# Patient Record
Sex: MALE | Race: WHITE | NOT HISPANIC OR LATINO | Employment: OTHER | URBAN - METROPOLITAN AREA
[De-identification: names, ages, dates, MRNs, and addresses within clinical notes are randomized per-mention and may not be internally consistent; named-entity substitution may affect disease eponyms.]

---

## 2023-03-13 LAB
ALANINE AMINOTRANSFERASE (SGPT) (U/L) IN SER/PLAS: 26 U/L (ref 10–52)
ALBUMIN (G/DL) IN SER/PLAS: 4.8 G/DL (ref 3.4–5)
ALKALINE PHOSPHATASE (U/L) IN SER/PLAS: 75 U/L (ref 33–136)
ANION GAP IN SER/PLAS: 12 MMOL/L (ref 10–20)
ASPARTATE AMINOTRANSFERASE (SGOT) (U/L) IN SER/PLAS: 27 U/L (ref 9–39)
BILIRUBIN TOTAL (MG/DL) IN SER/PLAS: 0.7 MG/DL (ref 0–1.2)
CALCIUM (MG/DL) IN SER/PLAS: 9.9 MG/DL (ref 8.6–10.3)
CARBON DIOXIDE, TOTAL (MMOL/L) IN SER/PLAS: 31 MMOL/L (ref 21–32)
CHLORIDE (MMOL/L) IN SER/PLAS: 100 MMOL/L (ref 98–107)
CREATININE (MG/DL) IN SER/PLAS: 0.81 MG/DL (ref 0.5–1.3)
GFR MALE: >90 ML/MIN/1.73M2
GLUCOSE (MG/DL) IN SER/PLAS: 143 MG/DL (ref 74–99)
POTASSIUM (MMOL/L) IN SER/PLAS: 3.9 MMOL/L (ref 3.5–5.3)
PROTEIN TOTAL: 8 G/DL (ref 6.4–8.2)
SODIUM (MMOL/L) IN SER/PLAS: 139 MMOL/L (ref 136–145)
UREA NITROGEN (MG/DL) IN SER/PLAS: 16 MG/DL (ref 6–23)

## 2023-03-16 LAB
PROSTATE SPECIFIC AG (NG/ML) IN SER/PLAS: 0.7 NG/ML (ref 0–4)
PROSTATE SPECIFIC AG FREE (NG/ML) IN SER/PLAS: <0.1 NG/ML
PROSTATE SPECIFIC AG FREE/PROSTATE SPECIFIC AG TOTAL IN SER/PLAS: <14 %

## 2023-11-04 PROBLEM — E66.9 OBESITY: Status: ACTIVE | Noted: 2018-05-17

## 2023-11-04 PROBLEM — E78.2 MIXED HYPERLIPIDEMIA: Status: ACTIVE | Noted: 2021-07-21

## 2023-11-04 PROBLEM — I10 HYPERTENSION: Status: ACTIVE | Noted: 2023-11-04

## 2023-11-04 PROBLEM — I48.91 ATRIAL FIBRILLATION WITH RVR (MULTI): Status: ACTIVE | Noted: 2021-09-28

## 2023-11-04 PROBLEM — Z95.1 S/P CABG X 3: Status: ACTIVE | Noted: 2023-11-04

## 2023-11-04 PROBLEM — D64.9 ANEMIA: Status: ACTIVE | Noted: 2023-11-04

## 2023-11-04 PROBLEM — K21.9 GERD (GASTROESOPHAGEAL REFLUX DISEASE): Status: ACTIVE | Noted: 2023-11-04

## 2023-11-04 PROBLEM — K59.00 CONSTIPATION: Status: ACTIVE | Noted: 2023-11-04

## 2023-11-04 PROBLEM — E11.9 TYPE 2 DIABETES MELLITUS WITHOUT COMPLICATIONS (MULTI): Status: ACTIVE | Noted: 2018-05-17

## 2023-11-04 PROBLEM — E88.810 METABOLIC SYNDROME: Status: ACTIVE | Noted: 2018-05-17

## 2023-11-04 PROBLEM — M10.9 GOUT: Status: ACTIVE | Noted: 2018-05-17

## 2023-11-04 PROBLEM — E78.5 HYPERLIPIDEMIA: Status: ACTIVE | Noted: 2023-11-04

## 2023-11-04 PROBLEM — U07.1 COVID-19: Status: ACTIVE | Noted: 2022-08-17

## 2023-11-04 PROBLEM — E78.00 PURE HYPERCHOLESTEROLEMIA, UNSPECIFIED: Status: ACTIVE | Noted: 2018-05-17

## 2023-11-04 PROBLEM — G89.18 POST-OP PAIN: Status: ACTIVE | Noted: 2023-11-04

## 2023-11-04 PROBLEM — S99.921A: Status: ACTIVE | Noted: 2019-09-23

## 2023-11-04 PROBLEM — S01.01XA SCALP LACERATION: Status: ACTIVE | Noted: 2021-06-09

## 2023-11-04 PROBLEM — M79.674 PAIN IN RIGHT TOE(S): Status: ACTIVE | Noted: 2019-09-23

## 2023-11-04 PROBLEM — C44.300 MALIGNANT NEOPLASM OF SKIN OF FACE: Status: ACTIVE | Noted: 2023-11-04

## 2023-11-04 PROBLEM — R20.2 PARESTHESIAS: Status: ACTIVE | Noted: 2023-11-04

## 2023-11-04 PROBLEM — S90.129A: Status: ACTIVE | Noted: 2019-09-23

## 2023-11-04 PROBLEM — I25.10 ATHEROSCLEROTIC HEART DISEASE OF NATIVE CORONARY ARTERY WITHOUT ANGINA PECTORIS: Status: ACTIVE | Noted: 2023-11-04

## 2023-11-04 PROBLEM — L30.9 ECZEMA: Status: ACTIVE | Noted: 2019-03-22

## 2023-11-04 PROBLEM — I63.9 ISCHEMIC STROKE (MULTI): Status: ACTIVE | Noted: 2023-11-04

## 2023-11-04 PROBLEM — I10 BENIGN ESSENTIAL HTN: Status: ACTIVE | Noted: 2021-07-21

## 2023-11-04 RX ORDER — GLYBURIDE 5 MG/1
5 TABLET ORAL DAILY
COMMUNITY
End: 2023-11-07 | Stop reason: ALTCHOICE

## 2023-11-04 RX ORDER — EZETIMIBE 10 MG/1
1 TABLET ORAL DAILY
COMMUNITY
Start: 2020-08-11

## 2023-11-04 RX ORDER — DAPAGLIFLOZIN 10 MG/1
10 TABLET, FILM COATED ORAL DAILY
COMMUNITY
Start: 2018-03-05

## 2023-11-04 RX ORDER — MULTIVITAMIN
1 TABLET ORAL DAILY
COMMUNITY

## 2023-11-04 RX ORDER — OMEPRAZOLE 20 MG/1
20 CAPSULE, DELAYED RELEASE ORAL DAILY
COMMUNITY

## 2023-11-04 RX ORDER — METOPROLOL TARTRATE 50 MG/1
50 TABLET ORAL 2 TIMES DAILY
COMMUNITY
End: 2023-11-07 | Stop reason: ALTCHOICE

## 2023-11-04 RX ORDER — ASPIRIN 325 MG
50000 TABLET, DELAYED RELEASE (ENTERIC COATED) ORAL
COMMUNITY
End: 2023-11-07 | Stop reason: ALTCHOICE

## 2023-11-04 RX ORDER — BLOOD-GLUCOSE METER
EACH MISCELLANEOUS
COMMUNITY
Start: 2019-07-08

## 2023-11-04 RX ORDER — AMIODARONE HYDROCHLORIDE 400 MG/1
1 TABLET ORAL DAILY
COMMUNITY
Start: 2019-03-05 | End: 2024-01-02 | Stop reason: ALTCHOICE

## 2023-11-04 RX ORDER — INSULIN DEGLUDEC 100 U/ML
INJECTION, SOLUTION SUBCUTANEOUS
COMMUNITY
Start: 2019-05-21

## 2023-11-04 RX ORDER — FOLIC ACID 1 MG/1
1 TABLET ORAL DAILY
COMMUNITY
End: 2023-11-07 | Stop reason: ALTCHOICE

## 2023-11-04 RX ORDER — LISINOPRIL 10 MG/1
10 TABLET ORAL DAILY
COMMUNITY
Start: 2019-03-22 | End: 2023-11-07 | Stop reason: ALTCHOICE

## 2023-11-04 RX ORDER — APIXABAN 5 MG/1
1 TABLET, FILM COATED ORAL 2 TIMES DAILY
COMMUNITY
Start: 2019-03-15

## 2023-11-04 RX ORDER — TADALAFIL 20 MG/1
20 TABLET ORAL DAILY PRN
COMMUNITY
Start: 2022-01-05

## 2023-11-04 RX ORDER — METFORMIN HYDROCHLORIDE 1000 MG/1
1000 TABLET ORAL
COMMUNITY
Start: 2018-05-18 | End: 2024-01-02 | Stop reason: DRUGHIGH

## 2023-11-04 RX ORDER — DOCUSATE SODIUM 100 MG/1
100 CAPSULE, LIQUID FILLED ORAL 2 TIMES DAILY PRN
COMMUNITY
Start: 2019-03-04 | End: 2023-11-07 | Stop reason: ALTCHOICE

## 2023-11-04 RX ORDER — HYDROGEN PEROXIDE 3 %
1 SOLUTION, NON-ORAL MISCELLANEOUS DAILY
COMMUNITY
End: 2023-11-07 | Stop reason: ALTCHOICE

## 2023-11-04 RX ORDER — EXENATIDE 2 MG/.85ML
INJECTION, SUSPENSION, EXTENDED RELEASE SUBCUTANEOUS
COMMUNITY

## 2023-11-04 RX ORDER — ACETAMINOPHEN 325 MG/1
650 TABLET ORAL EVERY 6 HOURS PRN
COMMUNITY
Start: 2019-03-05 | End: 2023-11-07 | Stop reason: ALTCHOICE

## 2023-11-04 RX ORDER — ALLOPURINOL 300 MG/1
300 TABLET ORAL DAILY
COMMUNITY
Start: 2022-12-02 | End: 2024-02-17

## 2023-11-04 RX ORDER — INSULIN GLARGINE 100 [IU]/ML
INJECTION, SOLUTION SUBCUTANEOUS
COMMUNITY
Start: 2019-03-05

## 2023-11-04 RX ORDER — ERGOCALCIFEROL 1.25 MG/1
50000 CAPSULE ORAL
COMMUNITY
Start: 2019-01-29 | End: 2023-11-07 | Stop reason: ALTCHOICE

## 2023-11-04 RX ORDER — NAPROXEN SODIUM 220 MG/1
81 TABLET, FILM COATED ORAL DAILY
COMMUNITY

## 2023-11-04 RX ORDER — METOPROLOL SUCCINATE 100 MG/1
TABLET, EXTENDED RELEASE ORAL
COMMUNITY
Start: 2022-03-08

## 2023-11-04 RX ORDER — ATORVASTATIN CALCIUM 80 MG/1
1 TABLET, FILM COATED ORAL NIGHTLY
COMMUNITY

## 2023-11-04 RX ORDER — IRON POLYSACCHARIDE COMPLEX 150 MG
150 CAPSULE ORAL DAILY
COMMUNITY
Start: 2019-03-05 | End: 2023-11-07 | Stop reason: ALTCHOICE

## 2023-11-07 ENCOUNTER — OFFICE VISIT (OUTPATIENT)
Dept: PRIMARY CARE | Facility: CLINIC | Age: 68
End: 2023-11-07
Payer: MEDICARE

## 2023-11-07 VITALS
HEART RATE: 71 BPM | HEIGHT: 68 IN | SYSTOLIC BLOOD PRESSURE: 126 MMHG | OXYGEN SATURATION: 94 % | TEMPERATURE: 97 F | WEIGHT: 203 LBS | BODY MASS INDEX: 30.77 KG/M2 | DIASTOLIC BLOOD PRESSURE: 82 MMHG

## 2023-11-07 DIAGNOSIS — R53.83 FATIGUE, UNSPECIFIED TYPE: Primary | ICD-10-CM

## 2023-11-07 PROCEDURE — 1126F AMNT PAIN NOTED NONE PRSNT: CPT

## 2023-11-07 PROCEDURE — 1036F TOBACCO NON-USER: CPT

## 2023-11-07 PROCEDURE — 1159F MED LIST DOCD IN RCRD: CPT

## 2023-11-07 PROCEDURE — 3074F SYST BP LT 130 MM HG: CPT

## 2023-11-07 PROCEDURE — 3008F BODY MASS INDEX DOCD: CPT

## 2023-11-07 PROCEDURE — 3079F DIAST BP 80-89 MM HG: CPT

## 2023-11-07 PROCEDURE — 1160F RVW MEDS BY RX/DR IN RCRD: CPT

## 2023-11-07 PROCEDURE — 99213 OFFICE O/P EST LOW 20 MIN: CPT

## 2023-11-07 ASSESSMENT — ENCOUNTER SYMPTOMS
DIAPHORESIS: 0
HEADACHES: 0
CHANGE IN BOWEL HABIT: 0
WEAKNESS: 0
COUGH: 0
MYALGIAS: 0
VOMITING: 0
ANOREXIA: 0
VISUAL CHANGE: 0
NECK PAIN: 0
SORE THROAT: 0
NAUSEA: 0
SWOLLEN GLANDS: 0
VERTIGO: 0
NUMBNESS: 0
ARTHRALGIAS: 0
JOINT SWELLING: 0
CHILLS: 0
FEVER: 0
FATIGUE: 1
ABDOMINAL PAIN: 0

## 2023-11-07 ASSESSMENT — PAIN SCALES - GENERAL: PAINLEVEL: 0-NO PAIN

## 2023-11-07 ASSESSMENT — PATIENT HEALTH QUESTIONNAIRE - PHQ9
1. LITTLE INTEREST OR PLEASURE IN DOING THINGS: NOT AT ALL
SUM OF ALL RESPONSES TO PHQ9 QUESTIONS 1 AND 2: 0
2. FEELING DOWN, DEPRESSED OR HOPELESS: NOT AT ALL

## 2023-11-07 NOTE — PROGRESS NOTES
Subjective   Patient ID: Mir Chao is a 68 y.o. male who presents for Fatigue (X 2 months. Napping during day. Sleeping 10-12 hours).    Mir reports that he has always felt some fatigue; however,the last 2-3 months his fatigue has increased.  Reports he sleeps 7-8 hours of sleep per night, feels rested most mornings, but reports that some days he feels like her needs to nap while at work.  States that he is able to work and enjoys activities such as golf but feels fatigued after exertional activities.     He had a follow up appointment with his cardiologist yesterday. He mentioned his fatigue to Dr. Kim, who decreased his Metoprolol from 150 mg per day down to 100 mg per day to help with some of the fatigue side effects that can accompany beta blockers. He also ordered lab work, including CBC, CMP, TSH, Mg, and Pro-BNP, which he has not had a chance to obtain yet.    Fatigue  This is a new problem. Episode onset: 2-3 months ago. The problem occurs constantly. The problem has been unchanged. Associated symptoms include fatigue. Pertinent negatives include no abdominal pain, anorexia, arthralgias, change in bowel habit, chest pain, chills, congestion, coughing, diaphoresis, fever, headaches, joint swelling, myalgias, nausea, neck pain, numbness, rash, sore throat, swollen glands, urinary symptoms, vertigo, visual change, vomiting or weakness. The symptoms are aggravated by stress and exertion. He has tried sleep for the symptoms.        Review of Systems   Constitutional:  Positive for fatigue. Negative for chills, diaphoresis and fever.   HENT:  Negative for congestion and sore throat.    Respiratory:  Negative for cough.    Cardiovascular:  Negative for chest pain.   Gastrointestinal:  Negative for abdominal pain, anorexia, change in bowel habit, nausea and vomiting.   Musculoskeletal:  Negative for arthralgias, joint swelling, myalgias and neck pain.   Skin:  Negative for rash.   Neurological:  Negative  "for vertigo, weakness, numbness and headaches.   All other systems reviewed and are negative.      Objective   Blood Pressure 126/82 (BP Location: Left arm)   Pulse 71   Temperature 36.1 °C (97 °F) (Temporal)   Height 1.715 m (5' 7.5\")   Weight 92.1 kg (203 lb)   Oxygen Saturation 94%   Body Mass Index 31.33 kg/m²     Physical Exam  Vitals and nursing note reviewed.   Constitutional:       General: He is not in acute distress.     Appearance: Normal appearance. He is well-developed and well-groomed. He is not ill-appearing or toxic-appearing.   Eyes:      Extraocular Movements: Extraocular movements intact.      Conjunctiva/sclera: Conjunctivae normal.      Pupils: Pupils are equal, round, and reactive to light.   Neck:      Thyroid: No thyromegaly or thyroid tenderness.      Vascular: No carotid bruit or JVD.      Trachea: Trachea and phonation normal.   Cardiovascular:      Rate and Rhythm: Normal rate and regular rhythm.      Pulses: Normal pulses.      Heart sounds: Normal heart sounds, S1 normal and S2 normal. No murmur heard.  Pulmonary:      Effort: Pulmonary effort is normal. No respiratory distress.      Breath sounds: Normal breath sounds and air entry. No decreased breath sounds, wheezing, rhonchi or rales.   Musculoskeletal:         General: Normal range of motion.      Cervical back: Full passive range of motion without pain, normal range of motion and neck supple. No tenderness.      Right lower leg: No edema.      Left lower leg: No edema.   Lymphadenopathy:      Cervical: No cervical adenopathy.   Skin:     General: Skin is warm and dry.      Capillary Refill: Capillary refill takes less than 2 seconds.   Neurological:      General: No focal deficit present.      Mental Status: He is alert and oriented to person, place, and time.   Psychiatric:         Mood and Affect: Mood normal.         Behavior: Behavior normal. Behavior is cooperative.         Thought Content: Thought content normal.       "   Judgment: Judgment normal.         Assessment/Plan   Problem List Items Addressed This Visit    None  Visit Diagnoses       Diagnosis Codes    Fatigue, unspecified type    -  Primary R53.83    Relevant Orders    Vitamin D 25-Hydroxy,Total (for eval of Vitamin D levels)    Body mass index (BMI) 30.0-30.9, adult     Z68.30    Relevant Orders    Vitamin D 25-Hydroxy,Total (for eval of Vitamin D levels)          Will obtain labs to rule out anemia, electrolyte imbalance, hypothyroidism, or vitamin D deficiency.  Will follow up with results.  Fatigue will likely be greatly improved by decreased beta blocker dose.  Discussed good sleep hygiene interventions.   1. Go to bed only when sleepy.   2. The bed is for sleep and sex only. Do not eat in bed, read, use the computer, phone or watch T.V.  3. If not asleep in 15-20 minutes leave the bed and engage in a calming activity elsewhere. (Example: Read or watch a movie you have seen before in dim, dark light.) Practice relaxation exercises.  4. Return to bed when sleepy.  5. Repeat.  6. You must have a fixed wake time every day. Use an alarm.  7. No napping.  8. Keep two weeks of sleep logs while you are doing this.  9. Sleep Hygiene:   Go to bed and wake up at around same times each day, even on weekends. Avoid naps.    If you cannot fall asleep within 15 to 30 minutes after going to bed, get up and read or do some other relaxing activity until you feel tired.    Reduce stress. Do something relaxing in the evening before bedtime.    Avoid caffeine (especially after noon), alcohol, tobacco, and medicines that keep you awake.    Alcohol and recreational drugs actually make insomnia more likely.    Try drinking less water in the evening to avoid waking up to go to the bathroom during the night.    Get regular exercise for 30-60 minutes at least 3 times a week, but do it at least 4-6 hours before bedtime.    [place] the bedroom only for sleep and sexual activity, not for  watching TV or other activities    Negative envisioning. Instead of trying to go to sleep, try to stay awake!    Avoid disturbing content in books or media immediately prior to sleep. Plan 30 minutes of pleasant or relaxing activity prior to going to bed.    The computer can act as a stimulant. Discontinue working or playing on the computer one hour prior to desired sleep onset.   Sleepio or Shut-[employer] are online programs for cognitive behavioral therapy which may help.    Patient to follow up as scheduled for annual CPE next month unless symptoms worsen.

## 2023-11-13 ENCOUNTER — LAB (OUTPATIENT)
Dept: LAB | Facility: LAB | Age: 68
End: 2023-11-13
Payer: MEDICARE

## 2023-11-13 DIAGNOSIS — E78.5 HYPERLIPIDEMIA, UNSPECIFIED: Primary | ICD-10-CM

## 2023-11-13 DIAGNOSIS — R53.83 FATIGUE, UNSPECIFIED TYPE: ICD-10-CM

## 2023-11-13 DIAGNOSIS — Z13.6 ENCOUNTER FOR SCREENING FOR CARDIOVASCULAR DISORDERS: ICD-10-CM

## 2023-11-13 DIAGNOSIS — R06.09 OTHER FORMS OF DYSPNEA: ICD-10-CM

## 2023-11-13 LAB
25(OH)D3 SERPL-MCNC: 27 NG/ML (ref 30–100)
ALBUMIN SERPL BCP-MCNC: 4.4 G/DL (ref 3.4–5)
ALP SERPL-CCNC: 65 U/L (ref 33–136)
ALT SERPL W P-5'-P-CCNC: 24 U/L (ref 10–52)
ANION GAP SERPL CALC-SCNC: 14 MMOL/L (ref 10–20)
AST SERPL W P-5'-P-CCNC: 26 U/L (ref 9–39)
BILIRUB SERPL-MCNC: 0.8 MG/DL (ref 0–1.2)
BNP SERPL-MCNC: 77 PG/ML (ref 0–99)
BUN SERPL-MCNC: 15 MG/DL (ref 6–23)
CALCIUM SERPL-MCNC: 9.6 MG/DL (ref 8.6–10.3)
CHLORIDE SERPL-SCNC: 98 MMOL/L (ref 98–107)
CHOLEST SERPL-MCNC: 107 MG/DL (ref 0–199)
CHOLESTEROL/HDL RATIO: 2.6
CO2 SERPL-SCNC: 29 MMOL/L (ref 21–32)
CREAT SERPL-MCNC: 0.95 MG/DL (ref 0.5–1.3)
ERYTHROCYTE [DISTWIDTH] IN BLOOD BY AUTOMATED COUNT: 14.9 % (ref 11.5–14.5)
GFR SERPL CREATININE-BSD FRML MDRD: 86 ML/MIN/1.73M*2
GLUCOSE SERPL-MCNC: 168 MG/DL (ref 74–99)
HCT VFR BLD AUTO: 44.8 % (ref 41–52)
HDLC SERPL-MCNC: 41.9 MG/DL
HGB BLD-MCNC: 14.4 G/DL (ref 13.5–17.5)
LDLC SERPL CALC-MCNC: 35 MG/DL
MAGNESIUM SERPL-MCNC: 1.85 MG/DL (ref 1.6–2.4)
MCH RBC QN AUTO: 29.7 PG (ref 26–34)
MCHC RBC AUTO-ENTMCNC: 32.1 G/DL (ref 32–36)
MCV RBC AUTO: 92 FL (ref 80–100)
NON HDL CHOLESTEROL: 65 MG/DL (ref 0–149)
NRBC BLD-RTO: 0 /100 WBCS (ref 0–0)
PLATELET # BLD AUTO: 207 X10*3/UL (ref 150–450)
POTASSIUM SERPL-SCNC: 4.6 MMOL/L (ref 3.5–5.3)
PROT SERPL-MCNC: 7 G/DL (ref 6.4–8.2)
RBC # BLD AUTO: 4.85 X10*6/UL (ref 4.5–5.9)
SODIUM SERPL-SCNC: 136 MMOL/L (ref 136–145)
TRIGL SERPL-MCNC: 149 MG/DL (ref 0–149)
TSH SERPL-ACNC: 3.85 MIU/L (ref 0.44–3.98)
VLDL: 30 MG/DL (ref 0–40)
WBC # BLD AUTO: 8 X10*3/UL (ref 4.4–11.3)

## 2023-11-13 PROCEDURE — 85027 COMPLETE CBC AUTOMATED: CPT

## 2023-11-13 PROCEDURE — 83880 ASSAY OF NATRIURETIC PEPTIDE: CPT

## 2023-11-13 PROCEDURE — 83735 ASSAY OF MAGNESIUM: CPT

## 2023-11-13 PROCEDURE — 84443 ASSAY THYROID STIM HORMONE: CPT

## 2023-11-13 PROCEDURE — 36415 COLL VENOUS BLD VENIPUNCTURE: CPT

## 2023-11-13 PROCEDURE — 80053 COMPREHEN METABOLIC PANEL: CPT

## 2023-11-13 PROCEDURE — 80061 LIPID PANEL: CPT

## 2023-11-13 PROCEDURE — 82306 VITAMIN D 25 HYDROXY: CPT

## 2023-11-15 ENCOUNTER — TELEPHONE (OUTPATIENT)
Dept: PRIMARY CARE | Facility: CLINIC | Age: 68
End: 2023-11-15
Payer: MEDICARE

## 2024-01-02 ENCOUNTER — OFFICE VISIT (OUTPATIENT)
Dept: PRIMARY CARE | Facility: CLINIC | Age: 69
End: 2024-01-02
Payer: MEDICARE

## 2024-01-02 ENCOUNTER — TELEPHONE (OUTPATIENT)
Dept: PRIMARY CARE | Facility: CLINIC | Age: 69
End: 2024-01-02

## 2024-01-02 VITALS
SYSTOLIC BLOOD PRESSURE: 110 MMHG | BODY MASS INDEX: 30.31 KG/M2 | OXYGEN SATURATION: 94 % | DIASTOLIC BLOOD PRESSURE: 76 MMHG | HEIGHT: 68 IN | WEIGHT: 200 LBS | RESPIRATION RATE: 18 BRPM | HEART RATE: 75 BPM

## 2024-01-02 DIAGNOSIS — I10 HYPERTENSION, UNSPECIFIED TYPE: ICD-10-CM

## 2024-01-02 DIAGNOSIS — R53.82 CHRONIC FATIGUE: ICD-10-CM

## 2024-01-02 DIAGNOSIS — E78.00 PURE HYPERCHOLESTEROLEMIA, UNSPECIFIED: ICD-10-CM

## 2024-01-02 DIAGNOSIS — E11.9 TYPE 2 DIABETES MELLITUS WITHOUT COMPLICATION, WITH LONG-TERM CURRENT USE OF INSULIN (MULTI): ICD-10-CM

## 2024-01-02 DIAGNOSIS — G89.29 CHRONIC PAIN OF LEFT KNEE: Primary | ICD-10-CM

## 2024-01-02 DIAGNOSIS — I10 BENIGN ESSENTIAL HTN: ICD-10-CM

## 2024-01-02 DIAGNOSIS — Z79.899 MEDICATION MANAGEMENT: ICD-10-CM

## 2024-01-02 DIAGNOSIS — Z79.4 TYPE 2 DIABETES MELLITUS WITHOUT COMPLICATION, WITH LONG-TERM CURRENT USE OF INSULIN (MULTI): ICD-10-CM

## 2024-01-02 DIAGNOSIS — M25.562 CHRONIC PAIN OF LEFT KNEE: Primary | ICD-10-CM

## 2024-01-02 DIAGNOSIS — Z23 ENCOUNTER FOR IMMUNIZATION: ICD-10-CM

## 2024-01-02 DIAGNOSIS — Z00.00 WELL ADULT EXAM: ICD-10-CM

## 2024-01-02 DIAGNOSIS — I25.10 ATHEROSCLEROSIS OF NATIVE CORONARY ARTERY OF NATIVE HEART WITHOUT ANGINA PECTORIS: ICD-10-CM

## 2024-01-02 DIAGNOSIS — I48.91 ATRIAL FIBRILLATION WITH RVR (MULTI): ICD-10-CM

## 2024-01-02 PROCEDURE — 1159F MED LIST DOCD IN RCRD: CPT | Performed by: FAMILY MEDICINE

## 2024-01-02 PROCEDURE — 90662 IIV NO PRSV INCREASED AG IM: CPT | Performed by: FAMILY MEDICINE

## 2024-01-02 PROCEDURE — 3078F DIAST BP <80 MM HG: CPT | Performed by: FAMILY MEDICINE

## 2024-01-02 PROCEDURE — 1170F FXNL STATUS ASSESSED: CPT | Performed by: FAMILY MEDICINE

## 2024-01-02 PROCEDURE — G0439 PPPS, SUBSEQ VISIT: HCPCS | Performed by: FAMILY MEDICINE

## 2024-01-02 PROCEDURE — 99212 OFFICE O/P EST SF 10 MIN: CPT | Performed by: FAMILY MEDICINE

## 2024-01-02 PROCEDURE — G0008 ADMIN INFLUENZA VIRUS VAC: HCPCS | Performed by: FAMILY MEDICINE

## 2024-01-02 PROCEDURE — 3008F BODY MASS INDEX DOCD: CPT | Performed by: FAMILY MEDICINE

## 2024-01-02 PROCEDURE — 3074F SYST BP LT 130 MM HG: CPT | Performed by: FAMILY MEDICINE

## 2024-01-02 PROCEDURE — 1036F TOBACCO NON-USER: CPT | Performed by: FAMILY MEDICINE

## 2024-01-02 PROCEDURE — 1125F AMNT PAIN NOTED PAIN PRSNT: CPT | Performed by: FAMILY MEDICINE

## 2024-01-02 RX ORDER — CIPROFLOXACIN 500 MG/1
TABLET ORAL EVERY 12 HOURS
COMMUNITY
Start: 2020-12-08 | End: 2024-01-02 | Stop reason: ALTCHOICE

## 2024-01-02 RX ORDER — METFORMIN HYDROCHLORIDE 500 MG/1
1000 TABLET, EXTENDED RELEASE ORAL 2 TIMES DAILY
COMMUNITY
Start: 2023-05-30

## 2024-01-02 ASSESSMENT — PROMIS GLOBAL HEALTH SCALE
RATE_MENTAL_HEALTH: VERY GOOD
CARRYOUT_PHYSICAL_ACTIVITIES: COMPLETELY
RATE_GENERAL_HEALTH: GOOD
CARRYOUT_SOCIAL_ACTIVITIES: VERY GOOD
RATE_SOCIAL_SATISFACTION: VERY GOOD
RATE_QUALITY_OF_LIFE: VERY GOOD
RATE_AVERAGE_PAIN: 4
EMOTIONAL_PROBLEMS: NEVER
RATE_AVERAGE_FATIGUE: SEVERE
RATE_PHYSICAL_HEALTH: GOOD

## 2024-01-02 ASSESSMENT — PAIN SCALES - GENERAL: PAINLEVEL: 4

## 2024-01-02 ASSESSMENT — ACTIVITIES OF DAILY LIVING (ADL)
FEEDING YOURSELF: INDEPENDENT
ASSISTIVE_DEVICE: EYEGLASSES
ADEQUATE_TO_COMPLETE_ADL: YES
TOILETING: INDEPENDENT
DRESSING YOURSELF: INDEPENDENT
GROOMING: INDEPENDENT
PATIENT'S MEMORY ADEQUATE TO SAFELY COMPLETE DAILY ACTIVITIES?: YES
HEARING - LEFT EAR: FUNCTIONAL
HEARING - RIGHT EAR: FUNCTIONAL
WALKS IN HOME: INDEPENDENT
JUDGMENT_ADEQUATE_SAFELY_COMPLETE_DAILY_ACTIVITIES: YES
BATHING: INDEPENDENT

## 2024-01-02 ASSESSMENT — PATIENT HEALTH QUESTIONNAIRE - PHQ9
2. FEELING DOWN, DEPRESSED OR HOPELESS: NOT AT ALL
1. LITTLE INTEREST OR PLEASURE IN DOING THINGS: NOT AT ALL
SUM OF ALL RESPONSES TO PHQ9 QUESTIONS 1 AND 2: 0

## 2024-01-02 ASSESSMENT — ENCOUNTER SYMPTOMS
OCCASIONAL FEELINGS OF UNSTEADINESS: 0
LOSS OF SENSATION IN FEET: 0
DEPRESSION: 0

## 2024-01-02 NOTE — ASSESSMENT & PLAN NOTE
No obvious source of his fatigue.  Blood work shows no indication and exam is normal.  Perhaps he has sleep apnea.  I recommended a sleep study test.  He is to think about this and call and let me know.

## 2024-01-02 NOTE — ASSESSMENT & PLAN NOTE
Anticipate guidance given.  Will give flu shot today.  Recommended COVID-vaccine.   NP without Attending Psychiatrist

## 2024-01-02 NOTE — ASSESSMENT & PLAN NOTE
Continue current medication.  I recommend he contact his new endocrinologist to set up an appointment.

## 2024-01-02 NOTE — ASSESSMENT & PLAN NOTE
Continue current medication.  Recommend checking blood work in 6 months.  He has been doing this through his cardiologist.

## 2024-01-02 NOTE — PROGRESS NOTES
Subjective   Patient ID: Mir Chao is a 68 y.o. male who presents for Medicare Annual Wellness Visit Subsequent.    HPI   Presents for annual wellness visit.  PMH, FMH, SH and medication list reviewed and updated in chart.  PHQ-2 reviewed.  Mini cog test reviewed.  Advance care planning reviewed.        Self-assessment of health - good        Issues with ADLs - 0        Issues with IADLs - 0        Issues with home safety - 0  Last colonoscopy was in 2022.  Small polyp was seen.  It was recommended he repeat this in 5 years.  This was done in [facility].    2. He is also here for follow-up of diabetes.  He is followed by an endocrinologist.  His recent endocrinologist retired and he is been referred to a new 1.  He is currently taking metformin Farxiga and Toujeo.  Most recent A1c was 6.9.  Recent FBS is 168.    3. He is also here for follow-up of hypercholesterolemia.  He is on a atorvastatin and ezetimibe through his cardiologist.  Recent LDL is 35.    4. He is also here for follow-up of coronary artery disease.  He is status post CABG in 2018.  He is followed by Dr. Kim and is on Eliquis, lisinopril and metoprolol.    5. He is also here for follow-up of hypertension.  He is on lisinopril, metoprolol.    6. He is also here for prostate cancer.  He is followed by urologist.  Last PSA was 0.7 in 3/23.    7. He is also here for left knee pain.  He said some achiness and problems with it for the past 6 or 7 years but became worse when he twisted it in 11/23.  He has no some swelling and stiffness.Is becoming increasingly more difficult for him to exercise or walk stairs.    8. He is also here for fatigue.  Over the past 6 to 10 months he has noticed being more fatigued throughout the day.  He is finding that he is sleeping more at night but yet still somewhat tired throughout the day.  He does take naps every now and then.  He states he often wakes up not feeling refreshed.  He states he used to snore but when  "he lost weight about 5 years ago the snoring has decreased.  He was never told he had apneic events while sleeping.    Review of Systems  Constitutional symptoms: No fever, loss of appetite, headaches, fatigue.  Eyes: Negative for vision loss or blurred or double vision.  ENT: Negative for hearing loss, tinnitus, nasal congestion, rhinorrhea, nosebleeds, teeth problems, mouth sores, sore throat or dysphagia.  Cardiovascular: Negative for chest pain/pressure, palpitations, edema, claudication.  Respiratory: Negative for shortness of breath, dyspnea on exertion, pain with breathing or coughing.  Breast: Negative for tenderness, masses, gynecomastia or discharge.  Gastrointestinal: Negative for anorexia, nausea, vomiting, indigestion, pain, change in bowel habits or blood in stool.  Musculoskeletal: Negative for joint pain, joint swelling, myalgias or cramps.  Skin: Negative for rash, changing skin lesions.  Neurological: Negative for headache, numbness, tingling, weakness or tremors.  Psychiatric: Negative for depression or anxiety.  Endocrine: Negative for marked change in weight, heat or cold intolerance, polyuria, polydipsia or polyphagia.  Hematologic: Negative for bruising or abnormal bleeding.    Objective   /76   Pulse 75   Resp 18   Ht 1.727 m (5' 8\")   Wt 90.7 kg (200 lb)   SpO2 94%   BMI 30.41 kg/m²     Physical Exam  General appearance: Vital signs have been reviewed.  Comfortable.  Well-nourished and well-developed.He is alert and oriented x3 and appears his stated age.The patient is cooperative with exam.  Head: Hair pattern reveals a normal pattern for patient's age and face shows no abnormalities.  Eyes: PERRLA x2, EOMI x2, conjunctive a and sclera are clear.  Ears: External bilateral ears with normal helix, tragus and earlobe.Bilateral canals are normal.Bilateral tympanic membranes are pearly gray and landmarks are well visualized.  Nose: Nasal mucosa both nostrils reveals no polyps, " ulcerations or lesions.  Throat:Teeth are in good repair.  Posterior pharynx reveals no abnormalities.  Neck: Supple without lymphadenopathy, thyromegaly or carotid bruits.  Lungs:Clear to auscultation bilaterally with no wheezes, rales or rhonchi.  Cardiovascular: RRR without MRG.No carotid bruits.  Extremities without edema and pulses are intact.  Abdomen: Soft, NT, no masses, no hepatosplenomegaly.  Genitalia: No testicular masses.  No evidence of renal hernia.Nontender.  Rectal: Deferred due to recent colonoscopy and recent urological exam.  Musculoskeletal: 5/5 and equal strength in bilateral upper and lower extremities.  Skin: Good turgor and without rashes.  Neurological: Good overall strength and no focal deficits.  Cranial nerves II through XII are grossly intact.  Psychiatric: Patient has appropriate judgment with good insight.  Mood is appropriate.    Assessment/Plan   Problem List Items Addressed This Visit             ICD-10-CM       High    Type 2 diabetes mellitus without complications (CMS/HCC) E11.9     Continue current medication.  I recommend he contact his new endocrinologist to set up an appointment.         Pure hypercholesterolemia, unspecified E78.00     Continue current medication.  Recommend checking blood work in 6 months.  He has been doing this through his cardiologist.         Atrial fibrillation with RVR (CMS/HCC) I48.91     Continue current medication and following with his cardiologist.         Benign essential HTN I10     Continue current medication following with his cardiologist.         Atherosclerotic heart disease of native coronary artery without angina pectoris I25.10     Continue current medication following with his cardiologist Dr. Kim.         Well adult exam Z00.00     Anticipate guidance given.  Will give flu shot today.  Recommended COVID-vaccine.            Medium    Chronic pain of left knee - Primary M25.562, G89.29     Will refer to Dr. Rosas for further  evaluation.  I suspect osteoarthritis.         Relevant Orders    Referral to Orthopaedic Surgery    Chronic fatigue R53.82     No obvious source of his fatigue.  Blood work shows no indication and exam is normal.  Perhaps he has sleep apnea.  I recommended a sleep study test.  He is to think about this and call and let me know.          Other Visit Diagnoses         Codes    Encounter for immunization     Z23    Relevant Orders    Flu vaccine, quadrivalent, high-dose, preservative free, age 65y+ (FLUZONE) (Completed)

## 2024-01-05 ENCOUNTER — OFFICE VISIT (OUTPATIENT)
Dept: ORTHOPEDIC SURGERY | Facility: CLINIC | Age: 69
End: 2024-01-05
Payer: MEDICARE

## 2024-01-05 ENCOUNTER — ANCILLARY PROCEDURE (OUTPATIENT)
Dept: RADIOLOGY | Facility: CLINIC | Age: 69
End: 2024-01-05
Payer: MEDICARE

## 2024-01-05 VITALS — BODY MASS INDEX: 26.52 KG/M2 | WEIGHT: 175 LBS | HEIGHT: 68 IN

## 2024-01-05 DIAGNOSIS — M94.262 CHONDROMALACIA, KNEE, LEFT: ICD-10-CM

## 2024-01-05 DIAGNOSIS — M22.2X2 PATELLOFEMORAL PAIN SYNDROME OF LEFT KNEE: ICD-10-CM

## 2024-01-05 DIAGNOSIS — M25.562 PAIN OF LEFT PATELLOFEMORAL JOINT: ICD-10-CM

## 2024-01-05 DIAGNOSIS — S83.242A ACUTE MEDIAL MENISCUS TEAR OF LEFT KNEE, INITIAL ENCOUNTER: ICD-10-CM

## 2024-01-05 DIAGNOSIS — G89.29 CHRONIC PAIN OF LEFT KNEE: ICD-10-CM

## 2024-01-05 DIAGNOSIS — M25.562 LEFT KNEE PAIN, UNSPECIFIED CHRONICITY: ICD-10-CM

## 2024-01-05 DIAGNOSIS — M25.562 CHRONIC PAIN OF LEFT KNEE: ICD-10-CM

## 2024-01-05 DIAGNOSIS — M17.12 ARTHRITIS OF LEFT KNEE: Primary | ICD-10-CM

## 2024-01-05 PROCEDURE — 1159F MED LIST DOCD IN RCRD: CPT | Performed by: ORTHOPAEDIC SURGERY

## 2024-01-05 PROCEDURE — 20610 DRAIN/INJ JOINT/BURSA W/O US: CPT | Performed by: ORTHOPAEDIC SURGERY

## 2024-01-05 PROCEDURE — 73564 X-RAY EXAM KNEE 4 OR MORE: CPT | Mod: LEFT SIDE | Performed by: RADIOLOGY

## 2024-01-05 PROCEDURE — 3008F BODY MASS INDEX DOCD: CPT | Performed by: ORTHOPAEDIC SURGERY

## 2024-01-05 PROCEDURE — 1036F TOBACCO NON-USER: CPT | Performed by: ORTHOPAEDIC SURGERY

## 2024-01-05 PROCEDURE — 73564 X-RAY EXAM KNEE 4 OR MORE: CPT | Mod: LT

## 2024-01-05 PROCEDURE — 99204 OFFICE O/P NEW MOD 45 MIN: CPT | Performed by: ORTHOPAEDIC SURGERY

## 2024-01-05 PROCEDURE — 1125F AMNT PAIN NOTED PAIN PRSNT: CPT | Performed by: ORTHOPAEDIC SURGERY

## 2024-01-05 RX ORDER — DICLOFENAC SODIUM 10 MG/G
4 GEL TOPICAL 4 TIMES DAILY PRN
Qty: 100 G | Refills: 0 | Status: SHIPPED | OUTPATIENT
Start: 2024-01-05

## 2024-01-05 RX ORDER — LIDOCAINE HYDROCHLORIDE 5 MG/ML
4 INJECTION, SOLUTION INFILTRATION; PERINEURAL
Status: COMPLETED | OUTPATIENT
Start: 2024-01-05 | End: 2024-01-05

## 2024-01-05 RX ORDER — TRIAMCINOLONE ACETONIDE 40 MG/ML
40 INJECTION, SUSPENSION INTRA-ARTICULAR; INTRAMUSCULAR
Status: COMPLETED | OUTPATIENT
Start: 2024-01-05 | End: 2024-01-05

## 2024-01-05 RX ADMIN — TRIAMCINOLONE ACETONIDE 40 MG: 40 INJECTION, SUSPENSION INTRA-ARTICULAR; INTRAMUSCULAR at 14:46

## 2024-01-05 RX ADMIN — LIDOCAINE HYDROCHLORIDE 4 ML: 5 INJECTION, SOLUTION INFILTRATION; PERINEURAL at 14:46

## 2024-01-05 ASSESSMENT — PAIN - FUNCTIONAL ASSESSMENT: PAIN_FUNCTIONAL_ASSESSMENT: 0-10

## 2024-01-05 ASSESSMENT — PAIN SCALES - GENERAL: PAINLEVEL_OUTOF10: 2

## 2024-01-05 NOTE — PROGRESS NOTES
68-year-old male with over 1 month of left knee pain when he was carrying a [place] tree and twisted his left knee.  He had pain primarily of the anterior and posterior aspect of the left knee worse activity better with rest.  Patient takes Eliquis    Patients' self reported past medical history, medications, allergies, surgical history, family and social history as well as a 10 point review of systems has been documented in the new patient intake form and scanned into the patient's electronic medical record.  The intake form was reviewed by Dr Murphy during the office visit and signed by Dr. Murphy and the patient.  Pertinent findings are documented in the HPI.    General Multi-System Physical Exam:  Constitutional  General appearance:  Alert, oriented, and in no acute distress.  Well developed, well nourished.  Head and Face  Head and face:  Normocephalic and atraumatic.  Ears, Nose, Mouth, and Throat  External inspection of ears and nose: Normal.  Eyes:  Pupils are equal and round.  Neck  Neck:  no neck mass was observed.  Pulmonary  Respiratory effort:  no respiratory distress.  Cardiovascular  Intact distal pulses.  Lymphatic  Palpation of lymph nodes in the affected extremity:  Normal.  Skin  Skin and subcutaneous tissue:  Normal skin color and pigmentation.  Normal skin turgor.  No rashes.  Neurologic  Sensation:  normal to light touch.  Psychiatric  Judgement and insight:  Intact.  Mood and affect:  Normal.  Musculoskeletal  Positive left knee patellar grind with mild medial and lateral joint line tenderness full range of motion the knee.  Patient has a negative Lockman exam, negative anterior and negative posterior drawer. The knee is stable to varus and valgus stress without pain. Patient is neurovascularly intact in the bilateral lower extremities.      X-rays of the patient were ordered by Dr Murphy and obtained today.  Dr Murphy personally reviewed the results of the x-rays.    In addition, Dr Murphy  independently interpreted the patient's x-rays (performed by the Radiology department) by viewing the x-ray images and this is Dr. Murphy's personal interpretation:     Mild arthritis left knee    We had a long discussion in regards to the patient's knee pain.  We discussed knee arthritis as well as meniscus tears today.      Nonoperative and operative therapies for knee arthritis include weight loss, physical therapy, anti-inflammatory medications, steroid injections, viscosupplementation injections, bracing, walking aids such as a cane, knee arthroscopy and total knee replacement.  Knee arthritis is a progressive disease and while we cannot reverse the stages of arthritis, we hope to make the patient more comfortable.     We talked about weight loss.  When the patient walks, especially going up and down stairs, their weight is magnified on their knee up to 5 times.  For that reason, 20 pounds of extra weight feels like 100 pounds of extra weight on the knees.  Excess weight is a well-known source of knee pain and losing weight can specifically help reduce the knee pain.  [facility] offers multiple ways to help the patient with weight loss including nutritionists and a bariatric department that would be more than happy to see and evaluate the patient if they had trouble with their weight and would like to lose weight.    If a meniscal tear is going to heal, it usually does so within the first 4 weeks.  The patient is usually aware that the meniscus has healed due to the fact that the pain resolves.  Unfortunately, due to the poor vascular supply of the meniscus, there is only about a 20% chance that a meniscal tear will heal on its own.  Possible treatment options for meniscus tears include nonoperative conservative therapy with anti-inflammatory medications, physical therapy, and sometimes steroid injections into the knee versus knee arthroscopy and meniscus debridement versus meniscus repair.      We  "will start off by treating the patient's knee conservatively (AKA non-operatively).  We gave the patient a prescription for physical therapy to work on increasing the range of motion and strength in the knee.  We also gave the patient a \"home exercise protocol\" list of exercises that they could work on to strengthen their knee at home.  We also called in a prescription for anti-inflammatories for the patient.  The patient was informed that there are rare risks of using nonsteroidal antiinflammatory (NSAID) medications.  Risks of NSAIDS include, but are not limited to, upset stomach, ulcers in the stomach and other places in the gastrointestinal tract, and a mild increase in cardiovascular risk as a result of the antiinflammatory medications.  In addition, there is an increased risk in bleeding as a result of the medications.  The patient was advised to stop taking the NSAIDs if they cause them to have an upset stomach.  The patient was instructed to take the medication on a p.r.n. basis as needed only.  NSAIDs are not supposed to be taken every day for more than a few weeks.  If they have any questions or problems with the antiinflammatory medications, they should stop taking the medication immediately and call the office.    We offered the patient an injection of steroids into their knee.  I explained to the patient that there are some rare risks of steroid injections.      Rare risks of steroid injections into the knee include pain at the site of the injection, local swelling, irritation from the injection or the skin spray, local discoloration of the skin, risk of bleeding, and a risk of knee infection.  If the patient does have a flareup of pain in the evening following the injection, they should ice the knee 15 minutes at a time 3 times a day for up to 3 days.  If the pain gets too severe, they should go to a local Emergency Room right away.      After understanding that there are risks and benefits of steroid " injections, the patient decided to proceed with injection.  The knee was prepped sterilely with betadyne and 5 mL of 0.25% Marcaine and 1 mL of Kenalog 40 mg was injected into the knee without complications.  A bandage was applied at the site of the injection.  The patient tolerated the procedure well.    We will see the patient back in 6-8 weeks to reevaluate their knee pain. If they are still having pain at that time, we would then need to order an MRI to look for possible meniscus tears and assess for cartilage damage in the knee.  The patient should call the office during business hours (9am-3pm; Monday - Friday)  with any questions or problems.  If the patient has any urgent issues outside of business hours, they should go to a local Emergency Room.          This patient has a new, acute, previously-undiagnosed problem of their affected extremity.  We will begin treatment as listed here and monitor treatment based upon their progression and response to treatment.  Due to the fact that we are just beginning treatment on this issue, this is currently considered an undiagnosed new problem with uncertain prognosis.  The exact diagnosis and their prognosis will depend upon their response to treatment and progression of their condition as time progresses.    Due to this patient's condition, they are at a moderate risk of morbidity from additional diagnostic testing / treatment.      To help them with their pain, I wrote them a prescription for prescription strength anti-inflammatories.  The patient was informed that there are risks of using nonsteroidal antiinflammatory (NSAID) medications.    Risks of NSAIDS include, but are not limited to, upset stomach, ulcers in the stomach and other places in the gastrointestinal tract, and a mild increase in cardiovascular risk as a result of the antiinflammatory medications.  In addition, there is an increased risk in bleeding as a result of the medications.    The patient was  advised to stop taking the NSAIDs if they cause them to have an upset stomach.  NSAIDs are not supposed to be taken every day for more than a few weeks.  If they have any questions or problems with the antiinflammatory medications, they should stop taking the medication immediately and call the office.      Patient ID: Mir Chao is a 68 y.o. male.    L Inj/Asp: L knee on 1/5/2024 2:46 PM  Indications: pain  Details: 22 G needle, anterolateral approach  Medications: 40 mg triamcinolone acetonide 40 mg/mL; 4 mL lidocaine 5 mg/mL (0.5 %)  Outcome: tolerated well, no immediate complications  Procedure, treatment alternatives, risks and benefits explained, specific risks discussed. Consent was given by the patient. Immediately prior to procedure a time out was called to verify the correct patient, procedure, equipment, support staff and site/side marked as required. Patient was prepped and draped in the usual sterile fashion.

## 2024-02-08 ENCOUNTER — EVALUATION (OUTPATIENT)
Dept: PHYSICAL THERAPY | Facility: CLINIC | Age: 69
End: 2024-02-08
Payer: MEDICARE

## 2024-02-08 DIAGNOSIS — M22.2X2 PATELLOFEMORAL PAIN SYNDROME OF LEFT KNEE: ICD-10-CM

## 2024-02-08 PROCEDURE — 97110 THERAPEUTIC EXERCISES: CPT | Mod: GP

## 2024-02-08 PROCEDURE — 97162 PT EVAL MOD COMPLEX 30 MIN: CPT | Mod: GP

## 2024-02-08 ASSESSMENT — ENCOUNTER SYMPTOMS
DEPRESSION: 0
LOSS OF SENSATION IN FEET: 0
OCCASIONAL FEELINGS OF UNSTEADINESS: 0

## 2024-02-08 ASSESSMENT — PAIN - FUNCTIONAL ASSESSMENT: PAIN_FUNCTIONAL_ASSESSMENT: 0-10

## 2024-02-08 ASSESSMENT — PAIN SCALES - GENERAL: PAINLEVEL_OUTOF10: 5 - MODERATE PAIN

## 2024-02-08 NOTE — Clinical Note
February 8, 2024    Miller Dawson, PT  [address] [facility]  Rehab Services, [facility] [address]  [facility] [address]    Patient: Mir Chao   YOB: 1955   Date of Visit: 2/8/2024       Dear KACI Murphy MD  [address] [facility] [place] [address]    The attached plan of care is being sent to you because your patient’s medical reimbursement requires that you certify the plan of care. Your signature is required to allow uninterrupted insurance coverage.      You may indicate your approval by signing below and faxing this form back to us at Dept Fax: [phone].    Please call Dept: [phone] with any questions or concerns.    Thank you for this referral,        Miller Dawson, PT  [facility] [address] [facility]  [address] [facility] [address]    Payer: Payor: MEDICARE / Plan: MEDICARE PART A AND B / Product Type: *No Product type* /                                                                         Date:     Dear Miller Dawson, PT,     Re: Mr. Mir Chao, MRN:[id number]    I certify that I have reviewed the attached plan of care and it is medically necessary for Mr. Mir Chao (1955) who is under my care.          ______________________________________                    _________________  Provider name and credentials                                           Date and time                                                                                           Plan of Care 2/8/24   Effective from: 2/8/2024  Effective to: 5/2/2024    Plan ID: [id number]            Participants as of Finalize on 2/8/2024    Name Type Comments Contact Info    KACI Murphy MD Referring Provider  [phone]    Miller Dawson PT Physical Therapist  [phone]       Last Plan Note     Author: Miller Dawson PT Status: Incomplete Last edited: 2/8/2024  4:00 PM           Physical Therapy  Physical Therapy Evaluation    Patient Name: Mir Chao  MRN:  [id number]  Today's Date: 2/8/2024  Time Calculation  Start Time: 1601  Stop Time: 1647  Time Calculation (min): 46 min    Insurance:  Visit number: 1 of 17  Insurance Type: no AUTH    General  Reason for visit: General  Reason for Referral: L knee pain  Past Medical History Relevant to Rehab: CVA, CABG, DM  General Comment: Pt felt a big twinge in his L knee around [place] time, hurts on descent of stairs and after ~12 minutes of treadmill work, states he had a stroke in 2016 effecting the L side    Current Problem  1. Patellofemoral pain syndrome of left knee  Referral to Physical Therapy    Follow Up In Physical Therapy          Precautions: none  Precautions  STEADI Fall Risk Score (The score of 4 or more indicates an increased risk of falling): 1    Medical History Form: Reviewed (scanned into chart)    Subjective:   Onset: 12/24/2023  CLARITA: Insidious     Current Condition since injury:   same     PAIN  Pain Assessment: 0-10  Pain Score: 5 - Moderate pain  Pain Type: Acute pain  Pain Location: Knee  Pain Orientation: Left    Aggravating Factors: Other stairs, exercises  Relieving Factors: Rest     Relevant Information (PMH & Previous Tests/Imaging): CVA, CABG, DM   Previous Interventions/Treatments: None     Prior Level of Function (PLOF)  Exercise/Physical Activity: limited by pain  Work/School: works as   Current ADL/IADL Status: no issues     Patients Living Environment: Reviewed and no concern    Primary Language: English    Pt goals for therapy: walk without pain and exercise more    Red Flags:   REVIEW OF SYSTEMS/ RED FLAGS  (-) osteoporosis  (-) balance difficulties/FALLS   (-) numbness/tingle  (-) weakness  (-) unremitting night pain   (-) AM Stiffness:            (-) Unexplained Wt. Loss  (+) h/o CA   (-) Pacemaker  (-) Bowel/Bladder            (-) saddle paresthesia    Objective:    LE FLOW SHEET    Gait:  decreased trunk swing, pelvic drop during RLE IC, decreased TKE LLE at IC     Lumbar  AROM  Lumbar AROM WFL: yes    Functional Rating Scale  Other: WOMAC 22% self reported disability    Lumbar AROM  Lumbar AROM WFL: yes    Palpation/Joint Mobility Assessment  TTP GT, glute insertion to IT band, pes anserine    Hip AROM  R hip flexion: (125°): 110  L hip flexion: (125°): 120  R hip abduction: (45°): 45  L hip abduction: (45°): 45  R hip extension: (10°): -10  L hip extension: (10°): 0  R hip ER: (45°): 30  L hip ER: (45°): 45  R hip IR: (45°): 20  L hip IR: (45°): 20    Flexibility  R hamstrings: -45  R hamstrings: -50    Specific Lower Extremity MMT  R Iliopsoas: (5/5): 5/5  L Iliopsoas: (5/5): 4+/5  R Gluteals (prone): (5/5): 3-/5  L Gluteals (prone): (5/5): 3-/5  R Gluteals (sidelying): (5/5): 3+/5  L Gluteals (sidelying): (5/5): 3+/5  R knee flexion: (5/5): 5/5  L knee flexion: (5/5): 4+/5  R knee extension: (5/5): 5/5  L knee extension: (5/5): 4+/5    Flexibility  R hamstrings: -45  L hamstrings: -45    Knee AROM  R knee flexion: (140°): 130  L knee flexion: (140°): 125  R knee extension: (0°): 0  L knee extension: (0°): -5    Knee special tests: all negative  With exception, LLE appears longer than RLE      EDUCATION: home exercise program, plan of care, activity modifications, pain management, and injury pathology       Goals:  Active       PT Problem        demo HEP w/ at least 75% accuracy in order to increase strength and flexibility        Start:  02/08/24    Expected End:  05/02/24            increased ROM of L knee 5 degrees for extension in order to improve gait mechanics        Start:  02/08/24    Expected End:  05/02/24            Pt will improve WOMAC score by 10% to demonstrate in order to show increased functional mobility        Start:  02/08/24    Expected End:  05/02/24            demonstrate 2/3 a muscle grade strength increase in b/l hips and L knee in order to complete stairs easier         Start:  02/08/24    Expected End:  05/02/24            report 25% decrease in pain  intensity in order to complete work tasks with greater ease        Start:  02/08/24    Expected End:  05/02/24              Treatments:   This therapist instructed and demonstrated interventions to patient, patient completed the following under direct supervision of this therapist:  Therapeutic Exercise:   min  16 MINUTES  Therapeutic Exercise  Therapeutic Exercise Activity 1: qaud set LLE towel propped under ankle x20  Therapeutic Exercise Activity 2: glute bridges 2x20  Therapeutic Exercise Activity 3: sidelying clamshells x20         Assessment:   Assessment Comment: Pt presents with L knee pain effecting the patellar tendon most that impairs his ability to negotiate stairs, exercise at his desired level, and walking.  He presents with decreased strength in the hips, and L knee, increased spasticity and clonus in the LLE residual from his stroke, TTP at the GT bursa and glute insertion at the IT band as well as pain to touch at the distal end of the IT band.  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  He would benefit from skilled therapy to improve mobility, gait, and strength to tallow for improve functional movement and QoL.    Plan:   Treatment/Interventions: Cryotherapy, Blood flow restriction therapy, Dry needling, Education/ Instruction, Electrical stimulation, Gait training, Hot pack, Manual therapy, Neuromuscular re-education, Self care/ home management, Taping techniques, Therapeutic activities, Therapeutic exercises  PT Plan: Skilled PT  PT Frequency: 1 time per week  Duration: 12 weeks  Rehab Potential: Good  Plan of Care Agreement: Patient    Access Code: [email or web address]  URL: [email or web address]  Date: 02/08/2024  Prepared by: Miller Dawson    Exercises  - Supine Bridge  - 2 x daily - 7 x weekly - 3 sets - 20-30 reps  - Clamshell  - 2 x daily - 7 x weekly - 3 sets - 20-30 reps  -  Long Sitting Quad Set with Towel Roll Under Heel  - 2 x daily - 7 x weekly - 3 sets - 20-30 reps      Miller Dawson PT               Current Participants as of 2/8/2024    Name Type Comments Contact Info    KACI Murphy MD Referring Provider  [phone]    Signature pending    Miller Dawson, FAVIOLA Physical Therapist  [phone]    Signature pending

## 2024-02-08 NOTE — PROGRESS NOTES
Physical Therapy  Physical Therapy Evaluation    Patient Name: Mir Chao  MRN: [id number]  Today's Date: 2/8/2024  Time Calculation  Start Time: 1601  Stop Time: 1647  Time Calculation (min): 46 min    Insurance:  Visit number: 1 of 17  Insurance Type: no AUTH    General  Reason for visit: General  Reason for Referral: L knee pain  Past Medical History Relevant to Rehab: CVA, CABG, DM  General Comment: Pt felt a big twinge in his L knee around [place] time, hurts on descent of stairs and after ~12 minutes of treadmill work, states he had a stroke in 2016 effecting the L side    Current Problem  1. Patellofemoral pain syndrome of left knee  Referral to Physical Therapy    Follow Up In Physical Therapy          Precautions: none  Precautions  STEADI Fall Risk Score (The score of 4 or more indicates an increased risk of falling): 1    Medical History Form: Reviewed (scanned into chart)    Subjective:   Onset: 12/24/2023  CLARITA: Insidious     Current Condition since injury:   same     PAIN  Pain Assessment: 0-10  Pain Score: 5 - Moderate pain  Pain Type: Acute pain  Pain Location: Knee  Pain Orientation: Left    Aggravating Factors: Other stairs, exercises  Relieving Factors: Rest     Relevant Information (PMH & Previous Tests/Imaging): CVA, CABG, DM   Previous Interventions/Treatments: None     Prior Level of Function (PLOF)  Exercise/Physical Activity: limited by pain  Work/School: works as   Current ADL/IADL Status: no issues     Patients Living Environment: Reviewed and no concern    Primary Language: English    Pt goals for therapy: walk without pain and exercise more    Red Flags:   REVIEW OF SYSTEMS/ RED FLAGS  (-) osteoporosis  (-) balance difficulties/FALLS   (-) numbness/tingle  (-) weakness  (-) unremitting night pain   (-) AM Stiffness:            (-) Unexplained Wt. Loss  (+) h/o CA   (-) Pacemaker  (-) Bowel/Bladder            (-) saddle paresthesia    Objective:    LE FLOW SHEET    Gait:   decreased trunk swing, pelvic drop during RLE IC, decreased TKE LLE at IC     Lumbar AROM  Lumbar AROM WFL: yes    Functional Rating Scale  Other: WOMAC 22% self reported disability    Lumbar AROM  Lumbar AROM WFL: yes    Palpation/Joint Mobility Assessment  TTP GT, glute insertion to IT band, pes anserine    Hip AROM  R hip flexion: (125°): 110  L hip flexion: (125°): 120  R hip abduction: (45°): 45  L hip abduction: (45°): 45  R hip extension: (10°): -10  L hip extension: (10°): 0  R hip ER: (45°): 30  L hip ER: (45°): 45  R hip IR: (45°): 20  L hip IR: (45°): 20    Flexibility  R hamstrings: -45  R hamstrings: -50    Specific Lower Extremity MMT  R Iliopsoas: (5/5): 5/5  L Iliopsoas: (5/5): 4+/5  R Gluteals (prone): (5/5): 3-/5  L Gluteals (prone): (5/5): 3-/5  R Gluteals (sidelying): (5/5): 3+/5  L Gluteals (sidelying): (5/5): 3+/5  R knee flexion: (5/5): 5/5  L knee flexion: (5/5): 4+/5  R knee extension: (5/5): 5/5  L knee extension: (5/5): 4+/5    Flexibility  R hamstrings: -45  L hamstrings: -45    Knee AROM  R knee flexion: (140°): 130  L knee flexion: (140°): 125  R knee extension: (0°): 0  L knee extension: (0°): -5    Knee special tests: all negative  With exception, LLE appears longer than RLE      EDUCATION: home exercise program, plan of care, activity modifications, pain management, and injury pathology       Goals:  Active       PT Problem        demo HEP w/ at least 75% accuracy in order to increase strength and flexibility        Start:  02/08/24    Expected End:  05/02/24            increased ROM of L knee 5 degrees for extension in order to improve gait mechanics        Start:  02/08/24    Expected End:  05/02/24            Pt will improve WOMAC score by 10% to demonstrate in order to show increased functional mobility        Start:  02/08/24    Expected End:  05/02/24            demonstrate 2/3 a muscle grade strength increase in b/l hips and L knee in order to complete stairs easier          Start:  02/08/24    Expected End:  05/02/24            report 25% decrease in pain intensity in order to complete work tasks with greater ease        Start:  02/08/24    Expected End:  05/02/24              Treatments:   This therapist instructed and demonstrated interventions to patient, patient completed the following under direct supervision of this therapist:  Therapeutic Exercise:   min  16 MINUTES  Therapeutic Exercise  Therapeutic Exercise Activity 1: qaud set LLE towel propped under ankle x20  Therapeutic Exercise Activity 2: glute bridges 2x20  Therapeutic Exercise Activity 3: sidelying clamshells x20         Assessment:   Assessment Comment: Pt presents with L knee pain effecting the patellar tendon most that impairs his ability to negotiate stairs, exercise at his desired level, and walking.  He presents with decreased strength in the hips, and L knee, increased spasticity and clonus in the LLE residual from his stroke, TTP at the GT bursa and glute insertion at the IT band as well as pain to touch at the distal end of the IT band.  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  He would benefit from skilled therapy to improve mobility, gait, and strength to tallow for improve functional movement and QoL.    Plan:   Treatment/Interventions: Cryotherapy, Blood flow restriction therapy, Dry needling, Education/ Instruction, Electrical stimulation, Gait training, Hot pack, Manual therapy, Neuromuscular re-education, Self care/ home management, Taping techniques, Therapeutic activities, Therapeutic exercises  PT Plan: Skilled PT  PT Frequency: 1 time per week  Duration: 12 weeks  Rehab Potential: Good  Plan of Care Agreement: Patient    Access Code: [email or web address]  URL: [email or web address]  Date: 02/08/2024  Prepared by: Miller Dawson    Exercises  - Supine Bridge  - 2 x daily - 7 x weekly - 3  sets - 20-30 reps  - Clamshell  - 2 x daily - 7 x weekly - 3 sets - 20-30 reps  - Long Sitting Quad Set with Towel Roll Under Heel  - 2 x daily - 7 x weekly - 3 sets - 20-30 reps      Miller Dawson, PT

## 2024-02-15 DIAGNOSIS — M10.9 GOUT, UNSPECIFIED CAUSE, UNSPECIFIED CHRONICITY, UNSPECIFIED SITE: ICD-10-CM

## 2024-02-17 RX ORDER — ALLOPURINOL 300 MG/1
300 TABLET ORAL DAILY
Qty: 90 TABLET | Refills: 3 | Status: SHIPPED | OUTPATIENT
Start: 2024-02-17

## 2024-02-22 ENCOUNTER — TREATMENT (OUTPATIENT)
Dept: PHYSICAL THERAPY | Facility: CLINIC | Age: 69
End: 2024-02-22
Payer: MEDICARE

## 2024-02-22 DIAGNOSIS — M22.2X2 PATELLOFEMORAL PAIN SYNDROME OF LEFT KNEE: ICD-10-CM

## 2024-02-22 PROCEDURE — 97110 THERAPEUTIC EXERCISES: CPT | Mod: GP | Performed by: PHYSICAL THERAPIST

## 2024-02-22 ASSESSMENT — PAIN SCALES - GENERAL: PAINLEVEL_OUTOF10: 0 - NO PAIN

## 2024-02-22 ASSESSMENT — PAIN - FUNCTIONAL ASSESSMENT: PAIN_FUNCTIONAL_ASSESSMENT: 0-10

## 2024-02-22 NOTE — PROGRESS NOTES
"Physical Therapy    Physical Therapy Treatment    Patient Name: Mir Chao  MRN: [id number]  Today's Date: 2/22/2024         Assessment:   Noted soreness and fatigue post treatment but no noted pain. Patient will benefit from further advancement of therapeutic exercise intensity to help reach long term goals   Plan:   Progress as tolerated    Current Problem  1. Patellofemoral pain syndrome of left knee  Follow Up In Physical Therapy          General     General  Reason for Referral: Left knee pain  Referred By: Jeffrey  Past Medical History Relevant to Rehab: CVA, CABG, DM  General Comment: \"I have not done the exercises as much as I should have, but I am feeling better.\"    Subjective    Pain  Pain Assessment  Pain Assessment: 0-10  Pain Score: 0 - No pain    Objective     Treatments:  Therapeutic Exercise  Therapeutic Exercise Performed: Yes  Therapeutic Exercise Activity 1: Scifit: Seat 13, level 1, 5'  Therapeutic Exercise Activity 2: Bridges: 12x; with hip adduction 12 x  Therapeutic Exercise Activity 3: Quad set: 10 x 5\" x 2  Therapeutic Exercise Activity 4: SAQ with 2#: 3 x 10 x 3\"  Therapeutic Exercise Activity 5: SLR with ER: 10 x 2  Therapeutic Exercise Activity 6: Clamshells: right side lying: 20 x 2 Left  Therapeutic Exercise Activity 7: Seated: LAQ: 15 x 2 with hip adduction  Therapeutic Exercise Activity 8: Standing TKE  Therapeutic Exercise Activity 9: alternating hip abduction: 20 x 2  Therapeutic Exercise Activity 10: HS curl: 2# 3 x 12  Total time: 40'    OP EDUCATION:   Light golf activity to begin preparing for the up coming season    Goals:  Active       PT Problem        demo HEP w/ at least 75% accuracy in order to increase strength and flexibility        Start:  02/08/24    Expected End:  05/02/24            increased ROM of L knee 5 degrees for extension in order to improve gait mechanics        Start:  02/08/24    Expected End:  05/02/24            Pt will improve WOMAC score by 10% to " demonstrate in order to show increased functional mobility        Start:  02/08/24    Expected End:  05/02/24            demonstrate 2/3 a muscle grade strength increase in b/l hips and L knee in order to complete stairs easier         Start:  02/08/24    Expected End:  05/02/24            report 25% decrease in pain intensity in order to complete work tasks with greater ease        Start:  02/08/24    Expected End:  05/02/24

## 2024-02-27 ENCOUNTER — TREATMENT (OUTPATIENT)
Dept: PHYSICAL THERAPY | Facility: CLINIC | Age: 69
End: 2024-02-27
Payer: MEDICARE

## 2024-02-27 DIAGNOSIS — M22.2X2 PATELLOFEMORAL PAIN SYNDROME OF LEFT KNEE: ICD-10-CM

## 2024-02-27 PROCEDURE — 97110 THERAPEUTIC EXERCISES: CPT | Mod: GP

## 2024-02-27 ASSESSMENT — PAIN SCALES - GENERAL: PAINLEVEL_OUTOF10: 0 - NO PAIN

## 2024-02-27 ASSESSMENT — PAIN - FUNCTIONAL ASSESSMENT: PAIN_FUNCTIONAL_ASSESSMENT: 0-10

## 2024-02-27 NOTE — PROGRESS NOTES
"    Physical Therapy  Physical Therapy Treatment    Patient Name: Mir Chao  MRN: [id number]  Today's Date: 2/27/2024  Time Calculation  Start Time: 0810  Stop Time: 0848  Time Calculation (min): 38 min    Insurance:  Visit number: 3 of 17      General  Chief Complaint:   1. Patellofemoral pain syndrome of left knee  Follow Up In Physical Therapy          Subjective:     Current Problem  General  Reason for Referral: Left knee pain  Referred By: Jeffrey  Past Medical History Relevant to Rehab: CVA, CABG, DM  General Comment: pt states he is completing his exercises about 1x a day    Precautions: none       Performing HEP?: Partially    Pain  Pain Assessment: 0-10  Pain Score: 0 - No pain      Objective:       Treatments:   This therapist instructed and demonstrated interventions to patient, patient completed the following under direct supervision of this therapist:  Therapeutic Exercise:   min  38 MINUTES  Therapeutic Exercise  Therapeutic Exercise Activity 1: lifecycle bicycle x5 mins level 1  Therapeutic Exercise Activity 2: glute bridges x15  Therapeutic Exercise Activity 3: quad set x15 1-2\" hold  Therapeutic Exercise Activity 4: SAQ with 2#: 3 x 10 x 3\"  Therapeutic Exercise Activity 5: SLR 2x15  Therapeutic Exercise Activity 6: sidelying clamshells LLE 2x15  Therapeutic Exercise Activity 7: HS curl: 2# x20  Therapeutic Exercise Activity 8: Standing TKE red TB x20  Therapeutic Exercise Activity 9: KT applied to L knee, mid tibia bifurcating around patella, and perpendicular      Assessment:  PT Assessment  Assessment Comment: pt required 50% cueing for HEP, no difficulty with today's exercises or pain    Plan:  Continue with hip and knee strengthening       Active       PT Problem        demo HEP w/ at least 75% accuracy in order to increase strength and flexibility  (Progressing)       Start:  02/08/24    Expected End:  05/02/24            increased ROM of L knee 5 degrees for extension in order to improve " gait mechanics  (Progressing)       Start:  02/08/24    Expected End:  05/02/24            Pt will improve WOMAC score by 10% to demonstrate in order to show increased functional mobility  (Progressing)       Start:  02/08/24    Expected End:  05/02/24            demonstrate 2/3 a muscle grade strength increase in b/l hips and L knee in order to complete stairs easier   (Progressing)       Start:  02/08/24    Expected End:  05/02/24            report 25% decrease in pain intensity in order to complete work tasks with greater ease  (Progressing)       Start:  02/08/24    Expected End:  05/02/24                Education:   Discussed longevity of KT and observing symptoms for next 24-48 hours with KT applied      Miller Dawson, PT

## 2024-03-07 ENCOUNTER — TREATMENT (OUTPATIENT)
Dept: PHYSICAL THERAPY | Facility: CLINIC | Age: 69
End: 2024-03-07
Payer: MEDICARE

## 2024-03-07 DIAGNOSIS — M22.2X2 PATELLOFEMORAL PAIN SYNDROME OF LEFT KNEE: Primary | ICD-10-CM

## 2024-03-07 PROCEDURE — 97110 THERAPEUTIC EXERCISES: CPT | Mod: GP

## 2024-03-07 NOTE — PROGRESS NOTES
"    Physical Therapy  Physical Therapy Treatment    Patient Name: Mir Chao  MRN: [id number]  Today's Date: 3/7/2024  Time Calculation  Start Time: 1724  Stop Time: 1808  Time Calculation (min): 44 min    Insurance:  Visit number: 4 of 17    Assessment:  PT Assessment  Assessment Comment: pt had no difficulty with exercises but reported some fatigue during last two exercises.  no increased pain with session    Plan:  Continue with hip and knee strengthening         General  Chief Complaint:   1. Patellofemoral pain syndrome of left knee            Subjective:     Current Problem  General  Reason for Referral: Left knee pain  Referred By: Jeffrey  Past Medical History Relevant to Rehab: CVA, CABG, DM         Performing HEP?: Yes    Pain       Objective:     Treatments:   This therapist instructed and demonstrated interventions to patient, patient completed the following under direct supervision of this therapist:  Therapeutic Exercise:   min  44 MINUTES  Therapeutic Exercise  Therapeutic Exercise Activity 1: LAQs 4# 2x20  Therapeutic Exercise Activity 2: calf raises 2x25  Therapeutic Exercise Activity 3: glute bridges 2x20  Therapeutic Exercise Activity 4: retro walking on treadmill  Therapeutic Exercise Activity 5: SLR 2x15  Therapeutic Exercise Activity 6: sidelying clamshells LLE 2x15  Therapeutic Exercise Activity 7: HS curl: 2# x20  Therapeutic Exercise Activity 8: Standing TKE red TB x20  Therapeutic Exercise Activity 9: KT applied to L knee, mid tibia bifurcating around patella, and perpendicular  Therapeutic Exercise Activity 10: standing on red foam with reciprocal taps to 6\" block BUEsupport x30  Therapeutic Exercise Activity 11: SLS LLE on red foam with LLE tap to cone for extended SLS LLE x25      Active       PT Problem        demo HEP w/ at least 75% accuracy in order to increase strength and flexibility  (Progressing)       Start:  02/08/24    Expected End:  05/02/24            increased ROM of L " knee 5 degrees for extension in order to improve gait mechanics  (Progressing)       Start:  02/08/24    Expected End:  05/02/24            Pt will improve WOMAC score by 10% to demonstrate in order to show increased functional mobility  (Progressing)       Start:  02/08/24    Expected End:  05/02/24            demonstrate 2/3 a muscle grade strength increase in b/l hips and L knee in order to complete stairs easier   (Progressing)       Start:  02/08/24    Expected End:  05/02/24            report 25% decrease in pain intensity in order to complete work tasks with greater ease  (Progressing)       Start:  02/08/24    Expected End:  05/02/24                Education:  Outpatient Education  Education Comment: discussed shoe inserts and leg length    Miller Dawson, PT

## 2024-03-11 ENCOUNTER — APPOINTMENT (OUTPATIENT)
Dept: PHYSICAL THERAPY | Facility: CLINIC | Age: 69
End: 2024-03-11
Payer: MEDICARE

## 2024-04-29 ENCOUNTER — DOCUMENTATION (OUTPATIENT)
Dept: PHYSICAL THERAPY | Facility: HOSPITAL | Age: 69
End: 2024-04-29
Payer: MEDICARE

## 2024-04-29 NOTE — PROGRESS NOTES
Physical Therapy    Discharge Summary    Name: Mir Chao  MRN: [id number]  : 1955  Date: 24    Discharge Summary: PT    Discharge Information: Date of discharge 2024, Date of last visit 3/7/2024, Date of evaluation 2024, Number of attended visits 3, Referred by Jeffrey, and Referred for knee pain    Therapy Summary: pt attended 3 session, progress unable to be measures due to short POC, unable to follow up with shoe inserts for leg length discrepancy     Discharge Status: unknown     Rehab Discharge Reason: Failed to schedule and/or keep follow-up appointment(s)

## 2024-07-15 ENCOUNTER — OFFICE VISIT (OUTPATIENT)
Dept: PRIMARY CARE | Facility: CLINIC | Age: 69
End: 2024-07-15
Payer: MEDICARE

## 2024-07-15 VITALS
SYSTOLIC BLOOD PRESSURE: 130 MMHG | BODY MASS INDEX: 31.37 KG/M2 | HEIGHT: 68 IN | OXYGEN SATURATION: 97 % | DIASTOLIC BLOOD PRESSURE: 68 MMHG | WEIGHT: 207 LBS | HEART RATE: 80 BPM | TEMPERATURE: 97.6 F

## 2024-07-15 DIAGNOSIS — J06.9 UPPER RESPIRATORY TRACT INFECTION, UNSPECIFIED TYPE: Primary | ICD-10-CM

## 2024-07-15 DIAGNOSIS — R05.1 ACUTE COUGH: ICD-10-CM

## 2024-07-15 LAB
POC BINAX EXPIRATION: NORMAL
POC BINAX NOW COVID SERIAL NUMBER: NORMAL
POC SARS-COV-2 AG BINAX: NORMAL

## 2024-07-15 PROCEDURE — 87811 SARS-COV-2 COVID19 W/OPTIC: CPT

## 2024-07-15 PROCEDURE — 1158F ADVNC CARE PLAN TLK DOCD: CPT

## 2024-07-15 PROCEDURE — 1159F MED LIST DOCD IN RCRD: CPT

## 2024-07-15 PROCEDURE — 1157F ADVNC CARE PLAN IN RCRD: CPT

## 2024-07-15 PROCEDURE — 1123F ACP DISCUSS/DSCN MKR DOCD: CPT

## 2024-07-15 PROCEDURE — 1160F RVW MEDS BY RX/DR IN RCRD: CPT

## 2024-07-15 PROCEDURE — 3008F BODY MASS INDEX DOCD: CPT

## 2024-07-15 PROCEDURE — 1126F AMNT PAIN NOTED NONE PRSNT: CPT

## 2024-07-15 PROCEDURE — 3075F SYST BP GE 130 - 139MM HG: CPT

## 2024-07-15 PROCEDURE — 3078F DIAST BP <80 MM HG: CPT

## 2024-07-15 PROCEDURE — 99213 OFFICE O/P EST LOW 20 MIN: CPT

## 2024-07-15 RX ORDER — LORATADINE 10 MG/1
10 TABLET ORAL DAILY
Qty: 30 TABLET | Refills: 0 | Status: SHIPPED | OUTPATIENT
Start: 2024-07-15 | End: 2024-08-14

## 2024-07-15 RX ORDER — FLUTICASONE PROPIONATE 50 MCG
1 SPRAY, SUSPENSION (ML) NASAL DAILY
Qty: 16 G | Refills: 0 | Status: SHIPPED | OUTPATIENT
Start: 2024-07-15 | End: 2024-08-14

## 2024-07-15 RX ORDER — BENZONATATE 100 MG/1
200 CAPSULE ORAL 3 TIMES DAILY PRN
Qty: 21 CAPSULE | Refills: 0 | Status: SHIPPED | OUTPATIENT
Start: 2024-07-15 | End: 2024-07-24 | Stop reason: ALTCHOICE

## 2024-07-15 ASSESSMENT — ENCOUNTER SYMPTOMS
TROUBLE SWALLOWING: 0
NAUSEA: 0
LIGHT-HEADEDNESS: 0
COUGH: 1
SORE THROAT: 1
RHINORRHEA: 1
SHORTNESS OF BREATH: 0
CHILLS: 1
DIZZINESS: 0
MYALGIAS: 1
DIAPHORESIS: 1
VOMITING: 0

## 2024-07-15 ASSESSMENT — LIFESTYLE VARIABLES
HOW OFTEN DO YOU HAVE A DRINK CONTAINING ALCOHOL: MONTHLY OR LESS
HOW MANY STANDARD DRINKS CONTAINING ALCOHOL DO YOU HAVE ON A TYPICAL DAY: 1 OR 2
SKIP TO QUESTIONS 9-10: 1
HOW OFTEN DO YOU HAVE SIX OR MORE DRINKS ON ONE OCCASION: NEVER
AUDIT-C TOTAL SCORE: 1

## 2024-07-15 ASSESSMENT — COLUMBIA-SUICIDE SEVERITY RATING SCALE - C-SSRS: 1. IN THE PAST MONTH, HAVE YOU WISHED YOU WERE DEAD OR WISHED YOU COULD GO TO SLEEP AND NOT WAKE UP?: NO

## 2024-07-15 ASSESSMENT — PAIN SCALES - GENERAL: PAINLEVEL: 0-NO PAIN

## 2024-07-15 ASSESSMENT — COPD QUESTIONNAIRES: COPD: 0

## 2024-07-15 NOTE — PROGRESS NOTES
"Subjective   Patient ID: Mir Chao is a 69 y.o. male who presents for Cough (Started yesterday /Pt states he was exposed by a family member with a common cold /He gets body chills on and off ).    Cough  This is a new problem. The current episode started yesterday. The cough is Non-productive. Associated symptoms include chills, myalgias, postnasal drip, rhinorrhea and a sore throat. Pertinent negatives include no chest pain, ear pain, nasal congestion or shortness of breath. Nothing aggravates the symptoms. He has tried nothing for the symptoms. There is no history of asthma, COPD or environmental allergies.   Just got back from a trip, cousin was sick.     Review of Systems   Constitutional:  Positive for chills and diaphoresis.   HENT:  Positive for postnasal drip, rhinorrhea and sore throat. Negative for congestion, ear pain and trouble swallowing.    Respiratory:  Positive for cough. Negative for shortness of breath.    Cardiovascular:  Negative for chest pain.   Gastrointestinal:  Negative for nausea and vomiting.   Musculoskeletal:  Positive for myalgias.   Allergic/Immunologic: Negative for environmental allergies.   Neurological:  Negative for dizziness and light-headedness.       Objective   /68   Pulse 80   Temp 36.4 °C (97.6 °F)   Ht 1.727 m (5' 8\")   Wt 93.9 kg (207 lb)   SpO2 97%   BMI 31.47 kg/m²     Physical Exam  Vitals and nursing note reviewed.   Constitutional:       General: He is not in acute distress.  HENT:      Right Ear: Tympanic membrane and ear canal normal.      Left Ear: Tympanic membrane and ear canal normal.      Nose: Nose normal.      Mouth/Throat:      Mouth: Mucous membranes are moist.   Eyes:      Extraocular Movements: Extraocular movements intact.      Conjunctiva/sclera: Conjunctivae normal.   Cardiovascular:      Rate and Rhythm: Normal rate and regular rhythm.   Pulmonary:      Effort: Pulmonary effort is normal.      Breath sounds: Normal breath sounds. "   Musculoskeletal:      Cervical back: Neck supple. No tenderness.   Lymphadenopathy:      Cervical: No cervical adenopathy.   Neurological:      General: No focal deficit present.      Mental Status: He is alert.   Psychiatric:         Mood and Affect: Mood normal.       Assessment/Plan   Problem List Items Addressed This Visit    None  Visit Diagnoses         Codes    Acute cough    -  Primary  Acute. POCT COVID negative. Likely viral, provided reassurance.   OTC Tylenol as directed for aches. Flonase as directed for sinus congestion. Claritin as directed for rhinorrhea. Increased fluids, rest, humidifier.   Benzonatate as needed for cough. Risks and benefits of medication discussed and prescribed.   Follow up if symptoms do not improve within 7-10 days.  R05.1    Relevant Orders    POCT BinaxNOW Covid-19 Ag Card manually resulted

## 2024-07-22 ENCOUNTER — TELEPHONE (OUTPATIENT)
Dept: PRIMARY CARE | Facility: CLINIC | Age: 69
End: 2024-07-22
Payer: MEDICARE

## 2024-07-22 NOTE — TELEPHONE ENCOUNTER
Patient called in [phone]  See on 7/15 by TRP  Congestion comes and goes.  Cough not gone and getting deeper.   Please advise.

## 2024-07-24 ENCOUNTER — OFFICE VISIT (OUTPATIENT)
Dept: PRIMARY CARE | Facility: CLINIC | Age: 69
End: 2024-07-24
Payer: MEDICARE

## 2024-07-24 VITALS
TEMPERATURE: 97.6 F | HEART RATE: 73 BPM | HEIGHT: 68 IN | OXYGEN SATURATION: 95 % | BODY MASS INDEX: 31.37 KG/M2 | SYSTOLIC BLOOD PRESSURE: 124 MMHG | WEIGHT: 207 LBS | DIASTOLIC BLOOD PRESSURE: 60 MMHG

## 2024-07-24 DIAGNOSIS — J01.40 ACUTE PANSINUSITIS, RECURRENCE NOT SPECIFIED: Primary | ICD-10-CM

## 2024-07-24 PROCEDURE — 1159F MED LIST DOCD IN RCRD: CPT

## 2024-07-24 PROCEDURE — 1160F RVW MEDS BY RX/DR IN RCRD: CPT

## 2024-07-24 PROCEDURE — 3008F BODY MASS INDEX DOCD: CPT

## 2024-07-24 PROCEDURE — 1126F AMNT PAIN NOTED NONE PRSNT: CPT

## 2024-07-24 PROCEDURE — 1123F ACP DISCUSS/DSCN MKR DOCD: CPT

## 2024-07-24 PROCEDURE — 99213 OFFICE O/P EST LOW 20 MIN: CPT

## 2024-07-24 PROCEDURE — 1157F ADVNC CARE PLAN IN RCRD: CPT

## 2024-07-24 PROCEDURE — 1036F TOBACCO NON-USER: CPT

## 2024-07-24 PROCEDURE — 3078F DIAST BP <80 MM HG: CPT

## 2024-07-24 PROCEDURE — 3074F SYST BP LT 130 MM HG: CPT

## 2024-07-24 RX ORDER — AMOXICILLIN AND CLAVULANATE POTASSIUM 875; 125 MG/1; MG/1
875 TABLET, FILM COATED ORAL 2 TIMES DAILY
Qty: 20 TABLET | Refills: 0 | Status: SHIPPED | OUTPATIENT
Start: 2024-07-24 | End: 2024-08-03

## 2024-07-24 ASSESSMENT — PATIENT HEALTH QUESTIONNAIRE - PHQ9
SUM OF ALL RESPONSES TO PHQ9 QUESTIONS 1 AND 2: 0
2. FEELING DOWN, DEPRESSED OR HOPELESS: NOT AT ALL
1. LITTLE INTEREST OR PLEASURE IN DOING THINGS: NOT AT ALL

## 2024-07-24 ASSESSMENT — ENCOUNTER SYMPTOMS
LIGHT-HEADEDNESS: 0
SHORTNESS OF BREATH: 0
WHEEZING: 0
SINUS PAIN: 1
FEVER: 0
FATIGUE: 1
CHILLS: 0
VOMITING: 0
DIZZINESS: 0
ABDOMINAL PAIN: 0
COUGH: 1
SORE THROAT: 1

## 2024-07-24 ASSESSMENT — COLUMBIA-SUICIDE SEVERITY RATING SCALE - C-SSRS: 1. IN THE PAST MONTH, HAVE YOU WISHED YOU WERE DEAD OR WISHED YOU COULD GO TO SLEEP AND NOT WAKE UP?: NO

## 2024-07-24 ASSESSMENT — PAIN SCALES - GENERAL: PAINLEVEL: 0-NO PAIN

## 2024-07-24 NOTE — PROGRESS NOTES
"Subjective   Patient ID: Mir Chao is a 69 y.o. male who presents for Nasal Congestion (Still not better since last week ), Cough, and Fatigue.    URI   This is a new problem. The current episode started 1 to 4 weeks ago. The problem has been waxing and waning. There has been no fever. Associated symptoms include congestion, coughing, sinus pain and a sore throat. Pertinent negatives include no abdominal pain, chest pain, plugged ear sensation, vomiting or wheezing. He has tried acetaminophen and decongestant for the symptoms. The treatment provided mild relief.        Review of Systems   Constitutional:  Positive for fatigue. Negative for chills and fever.   HENT:  Positive for congestion, postnasal drip, sinus pain and sore throat.    Respiratory:  Positive for cough. Negative for shortness of breath and wheezing.    Cardiovascular:  Negative for chest pain.   Gastrointestinal:  Negative for abdominal pain and vomiting.   Neurological:  Negative for dizziness and light-headedness.       Objective   /60   Pulse 73   Temp 36.4 °C (97.6 °F)   Ht 1.727 m (5' 8\")   Wt 93.9 kg (207 lb)   SpO2 95%   BMI 31.47 kg/m²     Physical Exam  Vitals and nursing note reviewed.   Constitutional:       General: He is not in acute distress.  HENT:      Right Ear: Tympanic membrane and ear canal normal.      Left Ear: Tympanic membrane and ear canal normal.      Nose: Congestion present.      Mouth/Throat:      Mouth: Mucous membranes are moist.      Pharynx: No oropharyngeal exudate or posterior oropharyngeal erythema.   Eyes:      Extraocular Movements: Extraocular movements intact.      Conjunctiva/sclera: Conjunctivae normal.   Cardiovascular:      Rate and Rhythm: Normal rate and regular rhythm.   Pulmonary:      Effort: Pulmonary effort is normal.      Breath sounds: Normal breath sounds.   Musculoskeletal:      Cervical back: Neck supple. No tenderness.   Lymphadenopathy:      Cervical: No cervical adenopathy. "   Neurological:      General: No focal deficit present.      Mental Status: He is alert.   Psychiatric:         Mood and Affect: Mood normal.       Assessment/Plan   Problem List Items Addressed This Visit    None  Visit Diagnoses         Codes    Acute pansinusitis, recurrence not specified    -  Primary  Acute.   Augmentin as directed. Risks and benefits of medication discussed and prescribed.   OTC Tylenol as directed for aches. Flonase as directed for sinus congestion. OTC Mucinex as directed for cough, chest congestion. Increased fluids, rest, humidifier.   Follow up if symptoms do not improve within 7-10 days.  J01.40    Relevant Medications    amoxicillin-pot clavulanate (Augmentin) 875-125 mg tablet

## 2024-07-25 ENCOUNTER — TELEPHONE (OUTPATIENT)
Dept: PRIMARY CARE | Facility: CLINIC | Age: 69
End: 2024-07-25
Payer: MEDICARE

## 2024-07-25 NOTE — TELEPHONE ENCOUNTER
Patient's assistant has been diagnosed with covid and she thinks she got it from patient, whose covid tests have been negative. Also, patient is feeling worse and still coughing than what he was at last appt. Please advise.

## 2024-07-25 NOTE — TELEPHONE ENCOUNTER
Spoke with patient. Continue antibiotic therapy, has been taking less than 24 hours. I suspect it will take 48-72 hours for symptoms to improve. Patient instructed to call office should symptoms worsen.

## 2024-10-13 ENCOUNTER — OFFICE VISIT (OUTPATIENT)
Dept: URGENT CARE | Age: 69
End: 2024-10-13
Payer: MEDICARE

## 2024-10-13 ENCOUNTER — ANCILLARY PROCEDURE (OUTPATIENT)
Dept: URGENT CARE | Age: 69
End: 2024-10-13
Payer: MEDICARE

## 2024-10-13 VITALS
TEMPERATURE: 98.5 F | HEART RATE: 78 BPM | RESPIRATION RATE: 18 BRPM | BODY MASS INDEX: 30.41 KG/M2 | SYSTOLIC BLOOD PRESSURE: 119 MMHG | DIASTOLIC BLOOD PRESSURE: 52 MMHG | WEIGHT: 200 LBS | OXYGEN SATURATION: 94 %

## 2024-10-13 DIAGNOSIS — S92.514A CLOSED NONDISPLACED FRACTURE OF PROXIMAL PHALANX OF LESSER TOE OF RIGHT FOOT, INITIAL ENCOUNTER: Primary | ICD-10-CM

## 2024-10-13 DIAGNOSIS — S99.921A INJURY OF TOE ON RIGHT FOOT, INITIAL ENCOUNTER: ICD-10-CM

## 2024-10-13 PROCEDURE — 73660 X-RAY EXAM OF TOE(S): CPT | Mod: RIGHT SIDE | Performed by: FAMILY MEDICINE

## 2024-10-13 NOTE — PATIENT INSTRUCTIONS
Buddy tape right 4th and 5th toes  Tylenol as needed  Apply ice, 10 minutes at a time  Follow up with new or worsening symptoms

## 2024-10-13 NOTE — PROGRESS NOTES
Subjective   Patient ID: Mir Chao is a 69 y.o. male. He presents today with a chief complaint of Injury (Right pinkie toe=stubbed this morning ). He stubbed his toe against an ottoman at home this morning.     History of Present Illness  HPI    Past Medical History  Allergies as of 10/13/2024 - Reviewed 10/13/2024   Allergen Reaction Noted    Heparin Hives 11/04/2023       (Not in a hospital admission)       Past Medical History:   Diagnosis Date    Old myocardial infarction 03/08/2019    History of myocardial infarction       Past Surgical History:   Procedure Laterality Date    OTHER SURGICAL HISTORY  03/29/2019    Coronary artery bypass graft        reports that he quit smoking about 8 years ago. His smoking use included cigarettes. He has been exposed to tobacco smoke. He has never used smokeless tobacco. He reports current alcohol use of about 5.0 standard drinks of alcohol per week. He reports that he does not use drugs.    Review of Systems  Review of Systems                               Objective    Vitals:    10/13/24 1518   BP: 119/52   Pulse: 78   Resp: 18   Temp: 36.9 °C (98.5 °F)   SpO2: 94%   Weight: 90.7 kg (200 lb)     No LMP for male patient.    Physical Exam  Constitutional:       Appearance: Normal appearance.   Cardiovascular:      Pulses: Normal pulses.   Musculoskeletal:      Comments: +ecchymoses and tenderness to palpation, right distal 5th toe   Skin:     General: Skin is warm and dry.   Neurological:      Mental Status: He is alert.         Procedures    Point of Care Test & Imaging Results from this visit  No results found for this visit on 10/13/24.   XR toe right 2+ views    Result Date: 10/13/2024  Interpreted By:  Riley Munoz, STUDY: XR TOE RIGHT 2+ VIEWS; ;  10/13/2024 3:33 pm   INDICATION: Signs/Symptoms:right 5th toe injury.   ,S99.921A Unspecified injury of right foot, initial encounter   COMPARISON: None.   ACCESSION NUMBER(S): [id number]   ORDERING CLINICIAN: GABBY  TYLER   FINDINGS: Right 5th digit, three views   There is a nondisplaced fracture through the base of the 5th proximal phalanx with intra-articular extension. Soft tissue edema present. Joints are maintained. No dislocation seen       Nondisplaced fracture of the base of the 5th proximal phalanx     MACRO: None   Signed by: Riley Munoz 10/13/2024 3:35 PM Dictation workstation:   [id number]     Diagnostic study results (if any) were reviewed by Cora Ramsey MD.    Assessment/Plan   Allergies, medications, history, and pertinent labs/EKGs/Imaging reviewed by Cora Ramsey MD.       Orders and Diagnoses  Diagnoses and all orders for this visit:  Injury of toe on right foot, initial encounter  -     XR toe right 2+ views; Future      Medical Admin Record      Patient disposition: Home    Electronically signed by Cora Ramsey MD  3:48 PM

## 2025-01-03 ENCOUNTER — LAB (OUTPATIENT)
Dept: LAB | Facility: LAB | Age: 70
End: 2025-01-03
Payer: MEDICARE

## 2025-01-03 ENCOUNTER — APPOINTMENT (OUTPATIENT)
Dept: PRIMARY CARE | Facility: CLINIC | Age: 70
End: 2025-01-03
Payer: MEDICARE

## 2025-01-03 DIAGNOSIS — Z79.899 MEDICATION MANAGEMENT: ICD-10-CM

## 2025-01-03 DIAGNOSIS — E78.00 PURE HYPERCHOLESTEROLEMIA, UNSPECIFIED: ICD-10-CM

## 2025-01-03 DIAGNOSIS — E11.9 TYPE 2 DIABETES MELLITUS WITHOUT COMPLICATION, WITH LONG-TERM CURRENT USE OF INSULIN (MULTI): ICD-10-CM

## 2025-01-03 DIAGNOSIS — I10 HYPERTENSION, UNSPECIFIED TYPE: ICD-10-CM

## 2025-01-03 DIAGNOSIS — Z79.4 TYPE 2 DIABETES MELLITUS WITHOUT COMPLICATION, WITH LONG-TERM CURRENT USE OF INSULIN (MULTI): ICD-10-CM

## 2025-01-03 LAB
ALBUMIN SERPL BCP-MCNC: 4.9 G/DL (ref 3.4–5)
ALP SERPL-CCNC: 66 U/L (ref 33–136)
ALT SERPL W P-5'-P-CCNC: 32 U/L (ref 10–52)
ANION GAP SERPL CALC-SCNC: 14 MMOL/L (ref 10–20)
APPEARANCE UR: CLEAR
AST SERPL W P-5'-P-CCNC: 26 U/L (ref 9–39)
BASOPHILS # BLD AUTO: 0.06 X10*3/UL (ref 0–0.1)
BASOPHILS NFR BLD AUTO: 1 %
BILIRUB SERPL-MCNC: 0.7 MG/DL (ref 0–1.2)
BILIRUB UR STRIP.AUTO-MCNC: NEGATIVE MG/DL
BUN SERPL-MCNC: 14 MG/DL (ref 6–23)
CALCIUM SERPL-MCNC: 9.8 MG/DL (ref 8.6–10.6)
CHLORIDE SERPL-SCNC: 99 MMOL/L (ref 98–107)
CHOLEST SERPL-MCNC: 149 MG/DL (ref 0–199)
CHOLESTEROL/HDL RATIO: 3.4
CO2 SERPL-SCNC: 29 MMOL/L (ref 21–32)
COLOR UR: ABNORMAL
CREAT SERPL-MCNC: 0.79 MG/DL (ref 0.5–1.3)
CREAT UR-MCNC: 84.5 MG/DL (ref 20–370)
EGFRCR SERPLBLD CKD-EPI 2021: >90 ML/MIN/1.73M*2
EOSINOPHIL # BLD AUTO: 0.34 X10*3/UL (ref 0–0.4)
EOSINOPHIL NFR BLD AUTO: 5.7 %
ERYTHROCYTE [DISTWIDTH] IN BLOOD BY AUTOMATED COUNT: 13.2 % (ref 11.5–14.5)
EST. AVERAGE GLUCOSE BLD GHB EST-MCNC: 209 MG/DL
GLUCOSE SERPL-MCNC: 203 MG/DL (ref 74–99)
GLUCOSE UR STRIP.AUTO-MCNC: ABNORMAL MG/DL
HBA1C MFR BLD: 8.9 %
HCT VFR BLD AUTO: 46.5 % (ref 41–52)
HDLC SERPL-MCNC: 43.8 MG/DL
HGB BLD-MCNC: 15.2 G/DL (ref 13.5–17.5)
IMM GRANULOCYTES # BLD AUTO: 0.01 X10*3/UL (ref 0–0.5)
IMM GRANULOCYTES NFR BLD AUTO: 0.2 % (ref 0–0.9)
KETONES UR STRIP.AUTO-MCNC: NEGATIVE MG/DL
LDLC SERPL CALC-MCNC: 68 MG/DL
LEUKOCYTE ESTERASE UR QL STRIP.AUTO: NEGATIVE
LYMPHOCYTES # BLD AUTO: 1.83 X10*3/UL (ref 0.8–3)
LYMPHOCYTES NFR BLD AUTO: 30.9 %
MCH RBC QN AUTO: 31.5 PG (ref 26–34)
MCHC RBC AUTO-ENTMCNC: 32.7 G/DL (ref 32–36)
MCV RBC AUTO: 97 FL (ref 80–100)
MICROALBUMIN UR-MCNC: 106.6 MG/L
MICROALBUMIN/CREAT UR: 126.2 UG/MG CREAT
MONOCYTES # BLD AUTO: 0.54 X10*3/UL (ref 0.05–0.8)
MONOCYTES NFR BLD AUTO: 9.1 %
MUCOUS THREADS #/AREA URNS AUTO: NORMAL /LPF
NEUTROPHILS # BLD AUTO: 3.14 X10*3/UL (ref 1.6–5.5)
NEUTROPHILS NFR BLD AUTO: 53.1 %
NITRITE UR QL STRIP.AUTO: NEGATIVE
NON HDL CHOLESTEROL: 105 MG/DL (ref 0–149)
NRBC BLD-RTO: 0 /100 WBCS (ref 0–0)
PH UR STRIP.AUTO: 5 [PH]
PLATELET # BLD AUTO: 202 X10*3/UL (ref 150–450)
POTASSIUM SERPL-SCNC: 4.4 MMOL/L (ref 3.5–5.3)
PROT SERPL-MCNC: 7.5 G/DL (ref 6.4–8.2)
PROT UR STRIP.AUTO-MCNC: ABNORMAL MG/DL
RBC # BLD AUTO: 4.82 X10*6/UL (ref 4.5–5.9)
RBC # UR STRIP.AUTO: NEGATIVE /UL
RBC #/AREA URNS AUTO: NORMAL /HPF
SODIUM SERPL-SCNC: 138 MMOL/L (ref 136–145)
SP GR UR STRIP.AUTO: 1.02
TRIGL SERPL-MCNC: 187 MG/DL (ref 0–149)
UROBILINOGEN UR STRIP.AUTO-MCNC: NORMAL MG/DL
VLDL: 37 MG/DL (ref 0–40)
WBC # BLD AUTO: 5.9 X10*3/UL (ref 4.4–11.3)
WBC #/AREA URNS AUTO: NORMAL /HPF

## 2025-01-03 PROCEDURE — 80053 COMPREHEN METABOLIC PANEL: CPT

## 2025-01-03 PROCEDURE — 83036 HEMOGLOBIN GLYCOSYLATED A1C: CPT

## 2025-01-03 PROCEDURE — 82043 UR ALBUMIN QUANTITATIVE: CPT

## 2025-01-03 PROCEDURE — 81001 URINALYSIS AUTO W/SCOPE: CPT

## 2025-01-03 PROCEDURE — 80061 LIPID PANEL: CPT

## 2025-01-03 PROCEDURE — 82570 ASSAY OF URINE CREATININE: CPT

## 2025-01-03 PROCEDURE — 85025 COMPLETE CBC W/AUTO DIFF WBC: CPT

## 2025-02-14 ENCOUNTER — APPOINTMENT (OUTPATIENT)
Dept: PRIMARY CARE | Facility: CLINIC | Age: 70
End: 2025-02-14
Payer: MEDICARE

## 2025-04-22 DIAGNOSIS — Z79.4 TYPE 2 DIABETES MELLITUS WITHOUT COMPLICATION, WITH LONG-TERM CURRENT USE OF INSULIN: ICD-10-CM

## 2025-04-22 DIAGNOSIS — E11.9 TYPE 2 DIABETES MELLITUS WITHOUT COMPLICATION, WITH LONG-TERM CURRENT USE OF INSULIN: ICD-10-CM

## 2025-06-10 DIAGNOSIS — M10.9 GOUT, UNSPECIFIED CAUSE, UNSPECIFIED CHRONICITY, UNSPECIFIED SITE: ICD-10-CM

## 2025-06-11 RX ORDER — ALLOPURINOL 300 MG/1
300 TABLET ORAL DAILY
Qty: 90 TABLET | Refills: 3 | Status: SHIPPED | OUTPATIENT
Start: 2025-06-11